# Patient Record
Sex: FEMALE | Race: WHITE | NOT HISPANIC OR LATINO | Employment: UNEMPLOYED
[De-identification: names, ages, dates, MRNs, and addresses within clinical notes are randomized per-mention and may not be internally consistent; named-entity substitution may affect disease eponyms.]

---

## 2017-01-30 ENCOUNTER — RECORDS - HEALTHEAST (OUTPATIENT)
Dept: ADMINISTRATIVE | Facility: OTHER | Age: 27
End: 2017-01-30

## 2021-09-24 ENCOUNTER — HOSPITAL ENCOUNTER (EMERGENCY)
Facility: CLINIC | Age: 31
Discharge: HOME | End: 2021-09-24
Admitting: PHYSICIAN ASSISTANT
Payer: COMMERCIAL

## 2021-09-24 VITALS
RESPIRATION RATE: 14 BRPM | SYSTOLIC BLOOD PRESSURE: 117 MMHG | WEIGHT: 108 LBS | DIASTOLIC BLOOD PRESSURE: 74 MMHG | HEART RATE: 92 BPM | TEMPERATURE: 98.9 F | OXYGEN SATURATION: 100 %
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
## 2021-09-24 DIAGNOSIS — S61.259A OPEN WOUND OF FINGER OF LEFT HAND DUE TO DOG BITE: ICD-10-CM

## 2021-09-24 DIAGNOSIS — S01.01XA SCALP LACERATION, INITIAL ENCOUNTER: ICD-10-CM

## 2021-09-24 DIAGNOSIS — S01.85XA OPEN WOUND OF FACE DUE TO DOG BITE: ICD-10-CM
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
## 2021-09-24 PROCEDURE — 12004 RPR S/N/AX/GEN/TRK7.6-12.5CM: CPT

## 2021-09-24 PROCEDURE — 250N000011 HC RX IP 250 OP 636: Performed by: PHYSICIAN ASSISTANT

## 2021-09-24 PROCEDURE — 90471 IMMUNIZATION ADMIN: CPT | Performed by: PHYSICIAN ASSISTANT

## 2021-09-24 PROCEDURE — 99283 EMERGENCY DEPT VISIT LOW MDM: CPT | Mod: 25

## 2021-09-24 PROCEDURE — 90715 TDAP VACCINE 7 YRS/> IM: CPT | Performed by: PHYSICIAN ASSISTANT

## 2021-09-24 PROCEDURE — 250N000013 HC RX MED GY IP 250 OP 250 PS 637: Performed by: PHYSICIAN ASSISTANT

## 2021-09-24 RX ORDER — ACETAMINOPHEN 325 MG/1
975 TABLET ORAL ONCE
Status: COMPLETED | OUTPATIENT
Start: 2021-09-24 | End: 2021-09-24

## 2021-09-24 RX ADMIN — CLOSTRIDIUM TETANI TOXOID ANTIGEN (FORMALDEHYDE INACTIVATED), CORYNEBACTERIUM DIPHTHERIAE TOXOID ANTIGEN (FORMALDEHYDE INACTIVATED), BORDETELLA PERTUSSIS TOXOID ANTIGEN (GLUTARALDEHYDE INACTIVATED), BORDETELLA PERTUSSIS FILAMENTOUS HEMAGGLUTININ ANTIGEN (FORMALDEHYDE INACTIVATED), BORDETELLA PERTUSSIS PERTACTIN ANTIGEN, AND BORDETELLA PERTUSSIS FIMBRIAE 2/3 ANTIGEN 0.5 ML: 5; 2; 2.5; 5; 3; 5 INJECTION, SUSPENSION INTRAMUSCULAR at 09:02

## 2021-09-24 RX ADMIN — ACETAMINOPHEN 975 MG: 325 TABLET ORAL at 09:53

## 2021-09-24 ASSESSMENT — ENCOUNTER SYMPTOMS
WOUND: 1
BRUISES/BLEEDS EASILY: 0
WEAKNESS: 0
NUMBNESS: 0

## 2021-09-24 NOTE — DISCHARGE INSTRUCTIONS
Dog Bite  A dog bite can cause a wound deep enough to break the skin. In such cases, the wound is cleaned and sometimes closed. Wounds would be closed if they are gaping open or in cosmetically important areas such as the face. If the wound is closed, it is usually not completely closed. This is so that fluid can drain if the wound becomes infected. Often, wounds will be left open to heal. In addition to wound care, a tetanus shot (injection) may be given, if needed.     Home care  Wash your hands well with soap and warm water before and after caring for the wound. This helps lower the risk of infection.  Care for the wound as directed. If a dressing was applied to the wound, be sure to change it as directed.  If the wound bleeds, place a clean, soft cloth on the wound. Then firmly apply pressure until the bleeding stops. This may take up to 5 minutes. Don't release the pressure and look at the wound during this time.  Most wounds heal within 10 days. But an infection can occur even with correct treatment. So be sure to check the wound daily for signs of infection (see below).  Antibiotics may be prescribed. These help prevent or treat infection. If you re given antibiotics, take them as directed. Also be sure to complete the medicines.  Rabies prevention  Rabies is a virus that can be carried in certain animals. These can include domestic animals such as dogs and cats. Pets fully vaccinated against rabies (2 shots) are at very low risk of infection. But because human rabies is almost always fatal, any biting pet should be confined for 10 days as an extra precaution. In general, if there is a risk for rabies, the following steps may need to be taken:   If someone s pet dog has bitten you, it should be kept in a secure area for the next 10 days to watch for signs of illness. (If the pet owner won t allow this, contact your local animal control center.) Ask to see the pet's vaccination history records. If the dog  becomes ill or dies during that time, contact your local animal control center at once so the animal may be tested for rabies. If the dog stays healthy for the next 10 days, there is no danger of rabies in the animal or you.  If a stray dog bit you, contact your local animal control center. They can give information on capture, quarantine, and animal rabies testing.  If you can t find the animal that bit you in the next 2 days, and if rabies exists in your area, you may need to receive the rabies vaccine series. Call your healthcare provider right away. Or return to the emergency department promptly.  Follow-up care  Follow up with your healthcare provider, or as directed.  When to get medical advice  Call your healthcare provider or get medical attention right away if any of these occur:   Signs of infection:  Spreading redness or warmth from the wound  Increased pain or swelling  Fever of 100.4 F (38 C) or higher, or as directed by your healthcare provider  Colored fluid or pus draining from the wound  Signs of rabies infection. Don't wait for any of these symptoms to occur! If you suspect that the dog that bit you is rabid, or if the dog is lost and can't be found, you should get the vaccine series.  Headache  Confusion  Strange behavior  Increased salivating and drooling  Seizure  Hallucination, anxiety, or agitation  Fever  Decreased ability to move any body part near the wound  Bleeding that can't be stopped after 5 minutes of firm pressure  Lauren last reviewed this educational content on 8/1/2019 2000-2020 [employer]. [address] [place] [address], [place] [address]. All rights reserved. This information is not intended as a substitute for professional medical care. Always follow your healthcare professional's instructions.  This information has been modified by your health care provider with permission from the publisher.      3 sutures and 7 staples have been placed to close your lacerations.  These should be removed in about 10 days.  Would recommend follow up at your clinic to have them removed.  Clean wounds daily with regular soap and water and apply thin layer of bacitracin or neosporin daily. Avoid getting wet for 24 hours and then avoid soaking the wound (no swimming in pools/lakes). Monitor for any signs of infection and return to the ER if you notice any redness, pain, swelling, drainage. You will take augmentin twice daily for 5 days to prevent infection.     Talk with your friend. If the dog is not vaccinated for rabies, you should really consider getting the rabies series.

## 2021-09-24 NOTE — ED PROVIDER NOTES
EMERGENCY DEPARTMENT ENCOUNTER      NAME: Shantell Irizarry  AGE: 30 year old female  YOB: 1990  MRN: [id number]  EVALUATION DATE & TIME: 9/24/2021  8:22 AM    PCP: No primary care provider on file.    ED PROVIDER: Shantell Graham PA-C      Chief Complaint   Patient presents with     Dog Bite     Laceration         FINAL IMPRESSION:  1. Dog bite, initial encounter    2. Scalp laceration, initial encounter    3. Open wound of face due to dog bite    4. Open wound of finger of left hand due to dog bite          MEDICAL DECISION MAKING:    Pertinent Labs & Imaging studies reviewed. (See chart for details)  30 year old female presents to the Emergency Department for evaluation of lacerations from dog bite. Patient's friend's dog attacked her this morning and she has lacerations to her scalp, face and left hand. She is unsure when her last tdap was. Unsure if dog is vaccinated. She denies loss of consciousness. Able to perform ROM of hand and fingers without difficulty.    Vitals reviewed and notable for elevated blood pressure which normalized on re-check. On exam she has two parallel lacerations to right parietal scalp, one small laceration to right temporal scalp, and puncture wounds to left 3rd and 5th digits. Distal CMTS intact. No visualized foreign bodies.     Patient declines xray of her hand. She has minimal tenderness and is able to perform full ROM of her fingers without pain. Low suspicion for fracture. Wound irrigated copiously. Larger scalp lacerations repaired with staples, smaller scalp laceration and puncture wounds to fingers closed loosely with sutures. She was placed on augmentin for infection prophylaxis. Tdap was updated. We discussed return precautions, wound care and follow up instructions for staple/suture removal. Patient discharged home in stable condition.     0 minutes of critical care time     ED COURSE:  8:32 AM I met with the patient, obtained history, performed an initial exam,  and discussed options and plan for diagnostics and treatment here in the ED.  9:22 AM Checked in on and updated patient.   9:45 AM Checked in on and updated patient.   9:56 AM Checked in on and updated patient. Performed laceration repairs and discussed the plan for discharge with the patient, and patient is agreeable.  We discussed supportive cares at home and reasons for return to the ER including new or worsening symptoms - all questions and concerns addressed.  Patient to be discharged by RN.    At the conclusion of the encounter I discussed the results of all of the tests and the disposition. The questions were answered. The patient acknowledged understanding and was agreeable with the care plan.     MEDICATIONS GIVEN IN THE EMERGENCY:  Medications   Tdap (tetanus-diphtheria-acell pertussis) (ADACEL) injection 0.5 mL (0.5 mLs Intramuscular Given 9/24/21 0902)   acetaminophen (TYLENOL) tablet 975 mg (975 mg Oral Given 9/24/21 0953)       NEW PRESCRIPTIONS STARTED AT TODAY'S ER VISIT  There are no discharge medications for this patient.           =================================================================    HPI:    Patient information was obtained from: Patient    Use of Interpretor: N/A        Shantell Irizarry is a 30 year old female with no pertinent history who presents to this ED private vehicle for evaluation of dog bite.    Patient was attacked approximately 1.5 hours prior to arrival by her friends Peter Alegre.  She sustained lacerations to her right scalp, 2 puncture wounds to her face, 2 puncture wounds on her left hand.  She denies any loss of consciousness.  She denies any difficulties with range of motion of her hand.  She is going to verify with her friend if the dog is vaccinated.  She is unsure when her last tetanus immunization was.      REVIEW OF SYSTEMS:  Review of Systems   Skin: Positive for wound (dog bite, lacerations to scalp, face and left hand).   Allergic/Immunologic: Negative  for immunocompromised state.   Neurological: Negative for weakness and numbness.   Hematological: Does not bruise/bleed easily.   All other systems reviewed and are negative.      PAST MEDICAL HISTORY:  No past medical history on file.    PAST SURGICAL HISTORY:  No past surgical history on file.        CURRENT MEDICATIONS:    No current facility-administered medications for this encounter.    Current Outpatient Medications:      amoxicillin-clavulanate (AUGMENTIN) 875-125 MG tablet, Take 1 tablet by mouth 2 times daily for 5 days, Disp: 10 tablet, Rfl: 0      ALLERGIES:  No Known Allergies    FAMILY HISTORY:  No family history on file.    SOCIAL HISTORY:   Social History     Socioeconomic History     Marital status: Single     Spouse name: Not on file     Number of children: Not on file     Years of education: Not on file     Highest education level: Not on file   Occupational History     Not on file   Tobacco Use     Smoking status: Not on file   Substance and Sexual Activity     Alcohol use: Not on file     Drug use: Not on file     Sexual activity: Not on file   Other Topics Concern     Not on file   Social History Narrative     Not on file     Social Determinants of Health     Financial Resource Strain:      Difficulty of Paying Living Expenses:    Food Insecurity:      Worried About Running Out of Food in the Last Year:      Ran Out of Food in the Last Year:    Transportation Needs:      Lack of Transportation (Medical):      Lack of Transportation (Non-Medical):    Physical Activity:      Days of Exercise per Week:      Minutes of Exercise per Session:    Stress:      Feeling of Stress :    Social Connections:      Frequency of Communication with Friends and Family:      Frequency of Social Gatherings with Friends and Family:      Attends [employer] Services:      Active Member of Clubs or Organizations:      Attends Club or Organization Meetings:      Marital Status:    Intimate Partner Violence:      Fear of  Current or Ex-Partner:      Emotionally Abused:      Physically Abused:      Sexually Abused:        VITALS:  Patient Vitals for the past 24 hrs:   BP Temp Temp src Pulse Resp SpO2 Weight   09/24/21 0910 117/74 -- -- -- -- -- --   09/24/21 0743 (!) 148/91 98.9  F (37.2  C) Oral 92 14 100 % 49 kg (108 lb)       PHYSICAL EXAM    Constitutional: Well developed, Well nourished, NAD  HENT: Normocephalic. Two puncture wounds along right zygomatic process. No visualized foreign bodies. 4 cm laceration on right parietal scalp parallel to a 5 cm laceration. 1 cm puncture wound to right temporal scalp. No foreign bodies. No active bleeding. 1 cm superficial abrasion to right auricle. No auricular hematoma. Oropharynx normal, mucous membranes moist, Nose normal.   Neck: Normal range of motion, No midline cervical spine tenderness, Supple, No stridor.  Eyes: Conjunctiva normal, No discharge.   Respiratory: Normal breath sounds, No respiratory distress, No wheezing, Speaks full sentences easily. No cough.  Cardiovascular: Normal heart rate, Regular rhythm No murmurs, No rubs, No gallops. Chest wall nontender.  GI: Soft, No tenderness, No masses, No flank tenderness. No rebound or guarding.  Musculoskeletal: 2+ radial pulses. No edema. No cyanosis, No clubbing. Good range of motion in all major joints.  Integument: Warm, Dry, No erythema, No rash. No petechiae. Puncture wound to left pinky finger and left middle finger. Full ROM intact at MCP, PIP and DIP. Brisk capillary refill. Distal sensation intact. No visualized foreign bodies.  Neurologic: Alert & oriented x 3, Normal motor function, Normal sensory function, No focal deficits noted. Normal gait.  Psychiatric: Affect normal, Judgment normal, Mood normal. Cooperative.    PROCEDURES:     PROCEDURE: Laceration Repair   INDICATIONS: Laceration   PROCEDURE PROVIDER:  Shantell Graham PA-C   SITE: Left 5th finger   TYPE/SIZE: simple, clean and no foreign body visualized  0.5 cm  (total length)   FUNCTIONAL ASSESSMENT: Distal sensation, circulation, motor and tendon function intact   MEDICATION: 1 mLs of 1% Lidocaine without epinephrine   PREPARATION: scrubbing and irrigation with Normal saline and Hibiclens   DEBRIDEMENT: no debridement and wound explored, no foreign body found   CLOSURE:  Wound was closed in   one layer: Skin closed with simple interrupted stitches of 5-0 Ethilon    Total number of sutures/staples placed: 1       PROCEDURE: Laceration Repair   INDICATIONS: Laceration   PROCEDURE PROVIDER:  Shantell Graham PA-C   SITE: Left middle finger   TYPE/SIZE: simple, clean and no foreign body visualized  0.5 cm (total length)   FUNCTIONAL ASSESSMENT: Distal sensation, circulation, motor and tendon function intact   MEDICATION: 1 mLs of 1% Lidocaine without epinephrine   PREPARATION: scrubbing and irrigation with Normal saline and Hibiclens   DEBRIDEMENT: no debridement and wound explored, no foreign body found   CLOSURE:  Wound was closed in   one layer: Skin closed with simple interrupted stitches of 5-0 Ethilon    Total number of sutures/staples placed: 1       PROCEDURE: Laceration Repair   INDICATIONS: Laceration   PROCEDURE PROVIDER:  Shantell Graham PA-C   SITE: Right scalp near temple   TYPE/SIZE: simple, clean and no foreign body visualized  1 cm (total length)   FUNCTIONAL ASSESSMENT: Distal sensation and circulation intact   MEDICATION: 1 mLs of 1% Lidocaine without epinephrine   PREPARATION: scrubbing and irrigation with Normal saline and Hibiclens   DEBRIDEMENT: no debridement and wound explored, no foreign body found   CLOSURE:  Wound was closed in   one layer: Skin closed with simple interrupted stitches of 5-0 Ethilon    Total number of sutures/staples placed: 1       PROCEDURE: Laceration Repair   INDICATIONS: Laceration   PROCEDURE PROVIDER:  Shantell Graham PA-C   SITE: Right parietal scalp   TYPE/SIZE: simple, clean and no foreign body visualized  4 cm (total length)    FUNCTIONAL ASSESSMENT: Distal sensation and circulation intact   MEDICATION: 2 mLs of 1% Lidocaine without epinephrine   PREPARATION: scrubbing and irrigation with Normal saline and Hibiclens   DEBRIDEMENT: no debridement and wound explored, no foreign body found   CLOSURE:  Wound was closed in   3 staples         PROCEDURE: Laceration Repair   INDICATIONS: Laceration   PROCEDURE PROVIDER:  Shantell Graham PA-C   SITE: Right parietal scalp   TYPE/SIZE: simple, clean and no foreign body visualized  5 cm (total length)   FUNCTIONAL ASSESSMENT: Distal sensation and circulation intact   MEDICATION: 2 mLs of 1% Lidocaine without epinephrine   PREPARATION: scrubbing and irrigation with Normal saline and Hibiclens   DEBRIDEMENT: no debridement and wound explored, no foreign body found   CLOSURE:  Wound was closed in   4 merari         VICKI, Kaley Burns, am serving as a scribe to document services personally performed by Shantell Graham PA-C based on my observation and the provider's statements to me. IShantell PA-C attest that Kaley Burns is acting in a scribe capacity, has observed my performance of the services and has documented them in accordance with my direction.    Shantell Graham PA-C  Emergency Medicine  [facility]  9/24/2021       Shantell Graham PA-C  09/25/21 1836

## 2021-09-24 NOTE — ED TRIAGE NOTES
Pt states she was attacked by a friends [place] laboy dog about 1.56 hours ago, Pt has large laceration to right side of scalp, puncture wounds to right side of face and left hand. Unsure if dog UTD on immunizations. Pt unsure of tetanus status, unable to visualize any other lacs due to a lot of dried blood on scalp area.

## 2022-05-29 ENCOUNTER — HOSPITAL ENCOUNTER (EMERGENCY)
Facility: CLINIC | Age: 32
Discharge: HOME | End: 2022-05-29
Payer: COMMERCIAL

## 2022-05-29 VITALS
RESPIRATION RATE: 18 BRPM | DIASTOLIC BLOOD PRESSURE: 75 MMHG | SYSTOLIC BLOOD PRESSURE: 118 MMHG | OXYGEN SATURATION: 95 % | HEART RATE: 119 BPM | HEIGHT: 63 IN | BODY MASS INDEX: 23.39 KG/M2 | TEMPERATURE: 99.7 F | WEIGHT: 132 LBS

## 2022-05-30 ENCOUNTER — APPOINTMENT (OUTPATIENT)
Dept: CT IMAGING | Facility: CLINIC | Age: 32
End: 2022-05-30
Attending: EMERGENCY MEDICINE
Payer: COMMERCIAL

## 2022-05-30 ENCOUNTER — HOSPITAL ENCOUNTER (EMERGENCY)
Facility: CLINIC | Age: 32
Discharge: HOME | End: 2022-05-30
Attending: EMERGENCY MEDICINE | Admitting: EMERGENCY MEDICINE
Payer: COMMERCIAL

## 2022-05-30 VITALS
HEIGHT: 62 IN | OXYGEN SATURATION: 99 % | RESPIRATION RATE: 18 BRPM | BODY MASS INDEX: 24.29 KG/M2 | HEART RATE: 96 BPM | TEMPERATURE: 98.7 F | DIASTOLIC BLOOD PRESSURE: 54 MMHG | SYSTOLIC BLOOD PRESSURE: 96 MMHG | WEIGHT: 132 LBS

## 2022-05-30 DIAGNOSIS — R07.9 CHEST PAIN, UNSPECIFIED TYPE: ICD-10-CM

## 2022-05-30 DIAGNOSIS — B00.1 HERPES LABIALIS: ICD-10-CM

## 2022-05-30 DIAGNOSIS — J18.9 COMMUNITY ACQUIRED PNEUMONIA OF RIGHT LOWER LOBE OF LUNG: ICD-10-CM

## 2022-05-30 LAB
ALBUMIN UR-MCNC: 70 MG/DL
ANION GAP SERPL CALCULATED.3IONS-SCNC: 12 MMOL/L (ref 5–18)
APPEARANCE UR: ABNORMAL
ATRIAL RATE - MUSE: 103 BPM
BACTERIA #/AREA URNS HPF: ABNORMAL /HPF
BASOPHILS # BLD AUTO: 0 10E3/UL (ref 0–0.2)
BASOPHILS NFR BLD AUTO: 0 %
BILIRUB UR QL STRIP: NEGATIVE
BNP SERPL-MCNC: 15 PG/ML (ref 0–64)
BUN SERPL-MCNC: 8 MG/DL (ref 8–22)
C REACTIVE PROTEIN LHE: 27.4 MG/DL (ref 0–0.8)
CALCIUM SERPL-MCNC: 8.7 MG/DL (ref 8.5–10.5)
CHLORIDE BLD-SCNC: 100 MMOL/L (ref 98–107)
CLUE CELLS: PRESENT
CO2 SERPL-SCNC: 23 MMOL/L (ref 22–31)
COLOR UR AUTO: YELLOW
CREAT SERPL-MCNC: 0.66 MG/DL (ref 0.6–1.1)
D DIMER PPP FEU-MCNC: 1.69 UG/ML FEU (ref 0–0.5)
DIASTOLIC BLOOD PRESSURE - MUSE: 58 MMHG
EOSINOPHIL # BLD AUTO: 0 10E3/UL (ref 0–0.7)
EOSINOPHIL NFR BLD AUTO: 0 %
ERYTHROCYTE [DISTWIDTH] IN BLOOD BY AUTOMATED COUNT: 12.9 % (ref 10–15)
GFR SERPL CREATININE-BSD FRML MDRD: >90 ML/MIN/1.73M2
GLUCOSE BLD-MCNC: 119 MG/DL (ref 70–125)
GLUCOSE UR STRIP-MCNC: NEGATIVE MG/DL
HCG UR QL: NEGATIVE
HCT VFR BLD AUTO: 34.5 % (ref 35–47)
HGB BLD-MCNC: 11.6 G/DL (ref 11.7–15.7)
HGB UR QL STRIP: ABNORMAL
HOLD SPECIMEN: NORMAL
HOLD SPECIMEN: NORMAL
IMM GRANULOCYTES # BLD: 0.1 10E3/UL
IMM GRANULOCYTES NFR BLD: 1 %
INTERPRETATION ECG - MUSE: NORMAL
KETONES UR STRIP-MCNC: NEGATIVE MG/DL
LEUKOCYTE ESTERASE UR QL STRIP: ABNORMAL
LYMPHOCYTES # BLD AUTO: 1.3 10E3/UL (ref 0.8–5.3)
LYMPHOCYTES NFR BLD AUTO: 8 %
MCH RBC QN AUTO: 27.8 PG (ref 26.5–33)
MCHC RBC AUTO-ENTMCNC: 33.6 G/DL (ref 31.5–36.5)
MCV RBC AUTO: 83 FL (ref 78–100)
MONOCYTES # BLD AUTO: 1 10E3/UL (ref 0–1.3)
MONOCYTES NFR BLD AUTO: 6 %
MUCOUS THREADS #/AREA URNS LPF: PRESENT /LPF
NEUTROPHILS # BLD AUTO: 13.6 10E3/UL (ref 1.6–8.3)
NEUTROPHILS NFR BLD AUTO: 85 %
NITRATE UR QL: POSITIVE
NRBC # BLD AUTO: 0 10E3/UL
NRBC BLD AUTO-RTO: 0 /100
P AXIS - MUSE: 62 DEGREES
PH UR STRIP: 6.5 [PH] (ref 5–7)
PLATELET # BLD AUTO: 161 10E3/UL (ref 150–450)
POTASSIUM BLD-SCNC: 3.5 MMOL/L (ref 3.5–5)
PR INTERVAL - MUSE: 128 MS
QRS DURATION - MUSE: 84 MS
QT - MUSE: 342 MS
QTC - MUSE: 448 MS
R AXIS - MUSE: 57 DEGREES
RBC # BLD AUTO: 4.17 10E6/UL (ref 3.8–5.2)
RBC URINE: 33 /HPF
SODIUM SERPL-SCNC: 135 MMOL/L (ref 136–145)
SP GR UR STRIP: 1.03 (ref 1–1.03)
SQUAMOUS EPITHELIAL: 2 /HPF
SYSTOLIC BLOOD PRESSURE - MUSE: 118 MMHG
T AXIS - MUSE: 44 DEGREES
TRICHOMONAS, WET PREP: ABNORMAL
TROPONIN I SERPL-MCNC: <0.01 NG/ML (ref 0–0.29)
UROBILINOGEN UR STRIP-MCNC: <2 MG/DL
VENTRICULAR RATE- MUSE: 103 BPM
WBC # BLD AUTO: 16 10E3/UL (ref 4–11)
WBC URINE: 173 /HPF
WBC'S/HIGH POWER FIELD, WET PREP: ABNORMAL
YEAST, WET PREP: ABNORMAL

## 2022-05-30 PROCEDURE — 87591 N.GONORRHOEAE DNA AMP PROB: CPT | Performed by: EMERGENCY MEDICINE

## 2022-05-30 PROCEDURE — 250N000013 HC RX MED GY IP 250 OP 250 PS 637: Performed by: EMERGENCY MEDICINE

## 2022-05-30 PROCEDURE — 96361 HYDRATE IV INFUSION ADD-ON: CPT

## 2022-05-30 PROCEDURE — 86140 C-REACTIVE PROTEIN: CPT | Performed by: EMERGENCY MEDICINE

## 2022-05-30 PROCEDURE — 84484 ASSAY OF TROPONIN QUANT: CPT | Performed by: EMERGENCY MEDICINE

## 2022-05-30 PROCEDURE — 87210 SMEAR WET MOUNT SALINE/INK: CPT | Performed by: EMERGENCY MEDICINE

## 2022-05-30 PROCEDURE — 258N000003 HC RX IP 258 OP 636: Performed by: EMERGENCY MEDICINE

## 2022-05-30 PROCEDURE — 81001 URINALYSIS AUTO W/SCOPE: CPT | Performed by: EMERGENCY MEDICINE

## 2022-05-30 PROCEDURE — 83880 ASSAY OF NATRIURETIC PEPTIDE: CPT | Performed by: EMERGENCY MEDICINE

## 2022-05-30 PROCEDURE — 96375 TX/PRO/DX INJ NEW DRUG ADDON: CPT

## 2022-05-30 PROCEDURE — 93005 ELECTROCARDIOGRAM TRACING: CPT | Performed by: EMERGENCY MEDICINE

## 2022-05-30 PROCEDURE — 96365 THER/PROPH/DIAG IV INF INIT: CPT

## 2022-05-30 PROCEDURE — 99285 EMERGENCY DEPT VISIT HI MDM: CPT | Mod: 25

## 2022-05-30 PROCEDURE — 250N000011 HC RX IP 250 OP 636: Performed by: EMERGENCY MEDICINE

## 2022-05-30 PROCEDURE — 36415 COLL VENOUS BLD VENIPUNCTURE: CPT | Performed by: EMERGENCY MEDICINE

## 2022-05-30 PROCEDURE — 85379 FIBRIN DEGRADATION QUANT: CPT | Performed by: EMERGENCY MEDICINE

## 2022-05-30 PROCEDURE — 71275 CT ANGIOGRAPHY CHEST: CPT

## 2022-05-30 PROCEDURE — 87086 URINE CULTURE/COLONY COUNT: CPT | Performed by: EMERGENCY MEDICINE

## 2022-05-30 PROCEDURE — 80048 BASIC METABOLIC PNL TOTAL CA: CPT | Performed by: EMERGENCY MEDICINE

## 2022-05-30 PROCEDURE — 87491 CHLMYD TRACH DNA AMP PROBE: CPT | Performed by: EMERGENCY MEDICINE

## 2022-05-30 PROCEDURE — 85014 HEMATOCRIT: CPT | Performed by: EMERGENCY MEDICINE

## 2022-05-30 PROCEDURE — 81025 URINE PREGNANCY TEST: CPT | Performed by: EMERGENCY MEDICINE

## 2022-05-30 RX ORDER — VALACYCLOVIR HYDROCHLORIDE 1 G/1
1000 TABLET, FILM COATED ORAL 2 TIMES DAILY
Qty: 20 TABLET | Refills: 0 | Status: SHIPPED | OUTPATIENT
Start: 2022-05-30 | End: 2024-07-09

## 2022-05-30 RX ORDER — AZITHROMYCIN 500 MG/1
500 TABLET, FILM COATED ORAL ONCE
Status: COMPLETED | OUTPATIENT
Start: 2022-05-30 | End: 2022-05-30

## 2022-05-30 RX ORDER — OXYCODONE AND ACETAMINOPHEN 5; 325 MG/1; MG/1
1 TABLET ORAL ONCE
Status: COMPLETED | OUTPATIENT
Start: 2022-05-30 | End: 2022-05-30

## 2022-05-30 RX ORDER — CEFDINIR 300 MG/1
300 CAPSULE ORAL 2 TIMES DAILY
Qty: 14 CAPSULE | Refills: 0 | Status: SHIPPED | OUTPATIENT
Start: 2022-05-30 | End: 2024-07-09

## 2022-05-30 RX ORDER — AZITHROMYCIN 500 MG/5ML
500 INJECTION, POWDER, LYOPHILIZED, FOR SOLUTION INTRAVENOUS ONCE
Status: DISCONTINUED | OUTPATIENT
Start: 2022-05-30 | End: 2022-05-30

## 2022-05-30 RX ORDER — AZITHROMYCIN 250 MG/1
250 TABLET, FILM COATED ORAL DAILY
Qty: 4 TABLET | Refills: 0 | Status: SHIPPED | OUTPATIENT
Start: 2022-05-31 | End: 2022-06-04

## 2022-05-30 RX ORDER — IOPAMIDOL 755 MG/ML
100 INJECTION, SOLUTION INTRAVASCULAR ONCE
Status: COMPLETED | OUTPATIENT
Start: 2022-05-30 | End: 2022-05-30

## 2022-05-30 RX ORDER — OXYCODONE HYDROCHLORIDE 5 MG/1
5 TABLET ORAL EVERY 6 HOURS PRN
Qty: 6 TABLET | Refills: 0 | Status: SHIPPED | OUTPATIENT
Start: 2022-05-30 | End: 2022-06-02

## 2022-05-30 RX ORDER — CEFTRIAXONE 1 G/1
1 INJECTION, POWDER, FOR SOLUTION INTRAMUSCULAR; INTRAVENOUS ONCE
Status: COMPLETED | OUTPATIENT
Start: 2022-05-30 | End: 2022-05-30

## 2022-05-30 RX ORDER — KETOROLAC TROMETHAMINE 15 MG/ML
15 INJECTION, SOLUTION INTRAMUSCULAR; INTRAVENOUS ONCE
Status: COMPLETED | OUTPATIENT
Start: 2022-05-30 | End: 2022-05-30

## 2022-05-30 RX ADMIN — CEFTRIAXONE 1 G: 1 INJECTION, POWDER, FOR SOLUTION INTRAMUSCULAR; INTRAVENOUS at 09:21

## 2022-05-30 RX ADMIN — KETOROLAC TROMETHAMINE 15 MG: 15 INJECTION, SOLUTION INTRAMUSCULAR; INTRAVENOUS at 08:09

## 2022-05-30 RX ADMIN — AZITHROMYCIN DIHYDRATE 500 MG: 500 TABLET, FILM COATED ORAL at 09:22

## 2022-05-30 RX ADMIN — IOPAMIDOL 100 ML: 755 INJECTION, SOLUTION INTRAVENOUS at 08:21

## 2022-05-30 RX ADMIN — OXYCODONE AND ACETAMINOPHEN 1 TABLET: 5; 325 TABLET ORAL at 09:22

## 2022-05-30 RX ADMIN — SODIUM CHLORIDE 500 ML: 9 INJECTION, SOLUTION INTRAVENOUS at 08:09

## 2022-05-30 NOTE — DISCHARGE INSTRUCTIONS
You were seen today in the emergency department for right-sided chest pain.  Your CT scan did show recurrent right lower lobe pneumonia.  You were treated with the first dose of antibiotics for this.  The rest of your labs looked generally stable.  Given your immunocompromise status and significant chest pain, we did discuss admitting you for this.  Since you would prefer to be treated at home, you need to finish the full 7-day course of antibiotics as prescribed and follow-up closely in the clinic.  We would like you to continue taking Tylenol 500 mg and ibuprofen 600 mg every 6 hours for pain.  We would like you to review things closely in clinic, you may require additional x-rays or referrals based on any ongoing pulmonary symptoms.  We are concerned about the fact that this pneumonia has now happened 3 times in the last couple of years in the same location.    As a secondary concern, your exam today also showed evidence of an ongoing genital herpes flare.  We are going to prescribe you an antiviral antibiotic to take in addition to control this.  We sent off some other STD swabs and we will call you if the results are positive.  Please refrain from sexual intercourse for the next week while your testing is in process.    We should see you back in the emergency department if you have any other immediate concerns like high fever and weakness, intractable chest pain, shortness of breath, etc.  Please continue to follow-up closely in clinic within a week for recheck on symptoms

## 2022-05-30 NOTE — ED TRIAGE NOTES
"Bilateral knee pain started 2 months ago.     Chest pain started 3-4 days ago to right side of chest. Increases with cough.     Cough and shortness of breath started 2 days ago. Able to talk in full sentences and walk back to room from triage.     Pt states that she was called and has UTI. Pt also states she has \"sores down there as well\".      Triage Assessment     Row Name 05/30/22 0653       Triage Assessment (Adult)    Airway WDL WDL       Respiratory WDL    Respiratory WDL WDL       Skin Circulation/Temperature WDL    Skin Circulation/Temperature WDL WDL       Cardiac WDL    Cardiac WDL chest pain       Chest Pain Assessment    Chest Pain Location anterior chest, right    Chest Pain Radiation shoulder;back    Character pressure       Peripheral/Neurovascular WDL    Peripheral Neurovascular WDL WDL       Cognitive/Neuro/Behavioral WDL    Cognitive/Neuro/Behavioral WDL WDL              "

## 2022-05-30 NOTE — ED PROVIDER NOTES
EMERGENCY DEPARTMENT ENCOUNTER      NAME: Shantell Irizarry  AGE: 31 year old female  YOB: 1990  MRN: [id number]  EVALUATION DATE & TIME: 2022  6:49 AM    PCP: [facility]    ED PROVIDER: Santos Harris M.D.      Chief Complaint   Patient presents with     Multiple Complaints       FINAL IMPRESSION:  1. Community acquired pneumonia of right lower lobe of lung    2. Chest pain, unspecified type    3. Herpes labialis        ED COURSE & MEDICAL DECISION MAKIN year old female presents to the Emergency Department for evaluation of right-sided chest pain, cough, lower extremity edema, and secondary concerns about possible UTI and labial pain.  She is tachycardic but otherwise vitally stable when she arrives to the emergency department.  History of SLE and a couple episodes of community-acquired pneumonia in her right lower lobe.  Her D-dimer is elevated today prompting CT pulmonary angiogram negative for PE but does show recurrent inflammatory/infectious process in the right lower lobe with a dense consolidation there.  I think this does correlate to a right-sided chest pain.  She also has a leukocytosis to 16 suggestive of some early systemic infection.  She does not have any urinary symptoms, however her urine also looks fairly concerning for infection with nitrite positive and a good degree of pyuria.  She was given a dose of ceftriaxone and oral azithromycin here to start treatment for her community-acquired pneumonia, should also be adequate urinary coverage.    Patient has secondary concern about labial pain.  On exam she does have a couple of vesicles on her right inner labia which appear consistent with herpes simplex.  She reports a history of this in the past, so I do not think any further testing would be required.  I am going to add on valacyclovir to treat this flare as well.  Given that she is having some vaginal discharge, I did send gonorrhea and  chlamydia swabs.  She should be empirically covered for gonorrhea with the IV ceftriaxone she received here and can follow-up on chlamydia screening, she is also going to be receiving azithromycin and a short course.    I discussed things with the patient.  At the end of the day I had initially recommended her for admission given her immunocompromise state, significant consolidation on x-ray, and pain.  Patient was strongly opposed to this, preferring any alternative to stay at home and treat with p.o. antibiotics.  Given that she is maintaining oxygen saturations in the upper 90s, is breathing comfortably on room air, and otherwise has generally stable labs, I do not think this is completely unreasonable.  We did discuss strict return precautions for any worsening shortness of breath, intractable chest pain, high fever and weakness, etc.  She will need very close follow-up with her primary/rheumatology provider to review any ongoing symptoms.  I think given the recurrence of this right lower lobe pneumonia, and outpatient pulmonary consultation would probably be in her best interest as well and I did discuss this with her.  Answered all of her questions, reviewed return precautions again, patient was discharged in stable condition.    6:56 AM I met with the patient, obtained history, performed an initial exam, and discussed options and plan for diagnostics and treatment here in the ED. PPE worn: N95 mask, gloves   9:41 AM We discussed the plan for discharge and the patient is agreeable. Reviewed supportive cares, symptomatic treatment, outpatient follow up, and reasons to return to the Emergency Department. Patient to be discharged by ED RN.       MEDICATIONS GIVEN IN THE EMERGENCY:  Medications   cefTRIAXone (ROCEPHIN) 1 g vial to attach to  mL bag for ADULTS or NS 50 mL bag for PEDS (1 g Intravenous New Bag 5/30/22 9065)   ketorolac (TORADOL) injection 15 mg (15 mg Intravenous Given 5/30/22 8215)   0.9%  "sodium chloride BOLUS (0 mLs Intravenous Stopped 5/30/22 0920)   iopamidol (ISOVUE-370) solution 100 mL (100 mLs Intravenous Given 5/30/22 0821)   azithromycin (ZITHROMAX) tablet 500 mg (500 mg Oral Given 5/30/22 0922)   oxyCODONE-acetaminophen (PERCOCET) 5-325 MG per tablet 1 tablet (1 tablet Oral Given 5/30/22 0922)       NEW PRESCRIPTIONS STARTED AT TODAY'S ER VISIT  New Prescriptions    AZITHROMYCIN (ZITHROMAX) 250 MG TABLET    Take 1 tablet (250 mg) by mouth daily for 4 days    CEFDINIR (OMNICEF) 300 MG CAPSULE    Take 1 capsule (300 mg) by mouth 2 times daily    OXYCODONE (ROXICODONE) 5 MG TABLET    Take 1 tablet (5 mg) by mouth every 6 hours as needed for breakthrough pain    VALACYCLOVIR (VALTREX) 1000 MG TABLET    Take 1 tablet (1,000 mg) by mouth 2 times daily for 10 days          =================================================================    HPI    Patient information was obtained from: Patient     Use of : N/A         Shantell Irizarry is a 31 year old female with a pertinent history of diabetes, anemia, TTP, lupus, and seizure who presents to this ED by walk in for evaluation of chest pain, leg swelling, and cough,     For the past week, the patient reports of bilateral leg swelling and redness, greater to the right than left. She adds that her knees feel like they're going to \"break\" whenever she gets up or sits down. The patient also reports that she has been having a cough for the past 2-3 days accompanied by right sided chest pain. She notes that taking deep breaths and laying on her right side worsens the pain. Today, the patient was unable to bear the chest pain, prompting her to be present in the ED. She has been taking 800 mg of Ibuprofen with no relief. She does not have a PCP but has a rheumatologist with [facility]. The patient otherwise denies abdominal pain and any other symptoms or complaints at this time.     Of note, the patient reports being diagnosed with a UTI at " "her Lupus work up last week. She was prescribed antibiotics and was told to see a provider for her \"sores down there.\" She has not taken the antibiotics.       REVIEW OF SYSTEMS   All systems reviewed and negative except as noted in HPI.    PAST MEDICAL HISTORY:  History reviewed. No pertinent past medical history.    PAST SURGICAL HISTORY:  History reviewed. No pertinent surgical history.        CURRENT MEDICATIONS:    Current Facility-Administered Medications   Medication     cefTRIAXone (ROCEPHIN) 1 g vial to attach to  mL bag for ADULTS or NS 50 mL bag for PEDS     Current Outpatient Medications   Medication     [START ON 5/31/2022] azithromycin (ZITHROMAX) 250 MG tablet     cefdinir (OMNICEF) 300 MG capsule     oxyCODONE (ROXICODONE) 5 MG tablet     valACYclovir (VALTREX) 1000 mg tablet         ALLERGIES:  No Known Allergies    FAMILY HISTORY:  History reviewed. No pertinent family history.    SOCIAL HISTORY:   Social History     Socioeconomic History     Marital status: Single       VITALS:  /55   Pulse 104   Temp 98.1  F (36.7  C) (Oral)   Resp 21   Ht 1.575 m (5' 2\")   Wt 59.9 kg (132 lb)   SpO2 99%   BMI 24.14 kg/m      PHYSICAL EXAM    Constitutional: Thin middle-aged female patient, laying in bed, no distress  HENT: Normocephalic, Atraumatic. Neck Supple.  Eyes: EOMI, Conjunctiva normal.  Respiratory: Crackles of the right lung base.  Lungs otherwise clear.  Breathing comfortably and speaking full sentences on room air.  Cardiovascular: Tachycardic heart rate, Regular rhythm. No peripheral edema.  Abdomen: Soft, nontender  : There are scattered vesicles involving the right inner labia.  There is moderate white vaginal discharge.  No cervical tenderness.  No other vaginal lesions.  Musculoskeletal: Good range of motion in all major joints. No major deformities noted.  Integument: Warm, Dry.  Neurologic: Alert & awake, Normal motor function, Normal sensory function, No focal deficits " noted.   Psychiatric: Cooperative. Affect appropriate.     LAB:  All pertinent labs reviewed and interpreted.  Labs Ordered and Resulted from Time of ED Arrival to Time of ED Departure   BASIC METABOLIC PANEL - Abnormal       Result Value    Sodium 135 (*)     Potassium 3.5      Chloride 100      Carbon Dioxide (CO2) 23      Anion Gap 12      Urea Nitrogen 8      Creatinine 0.66      Calcium 8.7      Glucose 119      GFR Estimate >90     D DIMER QUANTITATIVE - Abnormal    D-Dimer Quantitative 1.69 (*)    ROUTINE UA WITH MICROSCOPIC REFLEX TO CULTURE - Abnormal    Color Urine Yellow      Appearance Urine Turbid (*)     Glucose Urine Negative      Bilirubin Urine Negative      Ketones Urine Negative      Specific Gravity Urine 1.031 (*)     Blood Urine 0.03 mg/dL (*)     pH Urine 6.5      Protein Albumin Urine 70  (*)     Urobilinogen Urine <2.0      Nitrite Urine Positive (*)     Leukocyte Esterase Urine 500 Hyacinth/uL (*)     Bacteria Urine Many (*)     Mucus Urine Present (*)     RBC Urine 33 (*)     WBC Urine 173 (*)     Squamous Epithelials Urine 2 (*)    CRP INFLAMMATION - Abnormal    CRP 27.4 (*)    CBC WITH PLATELETS AND DIFFERENTIAL - Abnormal    WBC Count 16.0 (*)     RBC Count 4.17      Hemoglobin 11.6 (*)     Hematocrit 34.5 (*)     MCV 83      MCH 27.8      MCHC 33.6      RDW 12.9      Platelet Count 161      % Neutrophils 85      % Lymphocytes 8      % Monocytes 6      % Eosinophils 0      % Basophils 0      % Immature Granulocytes 1      NRBCs per 100 WBC 0      Absolute Neutrophils 13.6 (*)     Absolute Lymphocytes 1.3      Absolute Monocytes 1.0      Absolute Eosinophils 0.0      Absolute Basophils 0.0      Absolute Immature Granulocytes 0.1      Absolute NRBCs 0.0     WET PREPARATION - Abnormal    Trichomonas Absent      Yeast Absent      Clue Cells Present (*)     WBCs/high power field 4+ (*)    B-TYPE NATRIURETIC PEPTIDE ([facility] ONLY) - Normal    BNP 15     HCG QUALITATIVE URINE - Normal    hCG Urine  Qualitative Negative     TROPONIN I - Normal    Troponin I <0.01     URINE CULTURE   CHLAMYDIA TRACHOMATIS PCR   NEISSERIA GONORRHOEAE PCR       RADIOLOGY:  Reviewed all pertinent imaging. Please see official radiology report.  CT Chest Pulmonary Embolism w Contrast   Final Result   IMPRESSION:      1.  No acute pulmonary embolism or aortopathy.   2.  Right middle lobe consolidation which extends across the incomplete minor fissure to a limited extent in the anterior inferior right upper lobe, typical for a community-acquired bacterial pneumonia. The patient had similar consolidation in the right    middle lobe in November 2021 and in the lingula in October 2019, raising question of an underlying immune deficiency.   3.  Bilateral nonobstructing renal calculi.          EKG:    Performed at: 30-MAY-2022 07:27:58    Impression: Otherwise normal ECG    Rate: 103 bpm  Rhythm: Sinus tachycardia   Axis: 57  DE Interval: 128 ms  QRS Interval: 84 ms  QTc Interval: 448 ms  ST Changes: None  Comparison: No previous ECG available.     I have independently reviewed and interpreted the EKG(s) documented above.        I, Cheko Manzanares, am serving as a scribe to document services personally performed by Dr. Santos Harris, based on my observation and the provider's statements to me. I, Santos Harris MD attest that Cheko Manzanares is acting in a scribe capacity, has observed my performance of the services and has documented them in accordance with my direction.    Santos Harris M.D.  Emergency Medicine  [facility] EMERGENCY ROOM  [address] [address]  [phone]  Dept: [phone]     Santos Harris MD  05/30/22 0939

## 2022-05-30 NOTE — ED NOTES
Pt presents with C/O bilateral knee pain, lower leg swelling and pain.  Pt has a history of Lupus.  Also states a possible UTI, URI with cough, chest pains and SOB.

## 2022-05-30 NOTE — ED NOTES
MD updated on chest pain / shortness of breath. Wait on EKG as of now until MD sees. ...Nat Holland RN

## 2022-05-31 LAB
C TRACH DNA SPEC QL NAA+PROBE: NEGATIVE
N GONORRHOEA DNA SPEC QL NAA+PROBE: NEGATIVE

## 2022-06-01 LAB — BACTERIA UR CULT: ABNORMAL

## 2024-03-04 ENCOUNTER — HOSPITAL ENCOUNTER (EMERGENCY)
Facility: CLINIC | Age: 34
Discharge: HOME | End: 2024-03-04
Attending: EMERGENCY MEDICINE | Admitting: EMERGENCY MEDICINE
Payer: COMMERCIAL

## 2024-03-04 VITALS
HEIGHT: 62 IN | DIASTOLIC BLOOD PRESSURE: 70 MMHG | BODY MASS INDEX: 22.08 KG/M2 | TEMPERATURE: 97.7 F | OXYGEN SATURATION: 100 % | SYSTOLIC BLOOD PRESSURE: 110 MMHG | RESPIRATION RATE: 20 BRPM | HEART RATE: 88 BPM | WEIGHT: 120 LBS

## 2024-03-04 DIAGNOSIS — F11.20 FENTANYL USE DISORDER, SEVERE (H): ICD-10-CM

## 2024-03-04 DIAGNOSIS — F15.10 METHAMPHETAMINE ABUSE (H): ICD-10-CM

## 2024-03-04 LAB
ACANTHOCYTES BLD QL SMEAR: ABNORMAL
ALBUMIN SERPL BCG-MCNC: 3.8 G/DL (ref 3.5–5.2)
ALP SERPL-CCNC: 110 U/L (ref 40–150)
ALT SERPL W P-5'-P-CCNC: 10 U/L (ref 0–50)
ANION GAP SERPL CALCULATED.3IONS-SCNC: 7 MMOL/L (ref 7–15)
APAP SERPL-MCNC: <5 UG/ML (ref 10–30)
AST SERPL W P-5'-P-CCNC: 20 U/L (ref 0–45)
AUER BODIES BLD QL SMEAR: ABNORMAL
BASO STIPL BLD QL SMEAR: ABNORMAL
BASOPHILS # BLD AUTO: 0 10E3/UL (ref 0–0.2)
BASOPHILS NFR BLD AUTO: 1 %
BILIRUB SERPL-MCNC: 0.5 MG/DL
BITE CELLS BLD QL SMEAR: ABNORMAL
BLISTER CELLS BLD QL SMEAR: ABNORMAL
BUN SERPL-MCNC: 13.4 MG/DL (ref 6–20)
BURR CELLS BLD QL SMEAR: ABNORMAL
CALCIUM SERPL-MCNC: 9.1 MG/DL (ref 8.6–10)
CHLORIDE SERPL-SCNC: 103 MMOL/L (ref 98–107)
CREAT SERPL-MCNC: 0.65 MG/DL (ref 0.51–0.95)
DACRYOCYTES BLD QL SMEAR: ABNORMAL
DEPRECATED HCO3 PLAS-SCNC: 29 MMOL/L (ref 22–29)
EGFRCR SERPLBLD CKD-EPI 2021: >90 ML/MIN/1.73M2
ELLIPTOCYTES BLD QL SMEAR: ABNORMAL
EOSINOPHIL # BLD AUTO: 0.2 10E3/UL (ref 0–0.7)
EOSINOPHIL NFR BLD AUTO: 4 %
ERYTHROCYTE [DISTWIDTH] IN BLOOD BY AUTOMATED COUNT: 14.8 % (ref 10–15)
ETHANOL SERPL-MCNC: <0.01 G/DL
FRAGMENTS BLD QL SMEAR: ABNORMAL
GIANT PLATELETS BLD QL SMEAR: ABNORMAL
GLUCOSE SERPL-MCNC: 105 MG/DL (ref 70–99)
HCT VFR BLD AUTO: 37.1 % (ref 35–47)
HGB BLD-MCNC: 12 G/DL (ref 11.7–15.7)
HGB C CRYSTALS: ABNORMAL
HOWELL-JOLLY BOD BLD QL SMEAR: ABNORMAL
IMM GRANULOCYTES # BLD: 0 10E3/UL
IMM GRANULOCYTES NFR BLD: 0 %
LYMPHOCYTES # BLD AUTO: 1.6 10E3/UL (ref 0–5.3)
LYMPHOCYTES NFR BLD AUTO: 28 %
MCH RBC QN AUTO: 26 PG (ref 26.5–33)
MCHC RBC AUTO-ENTMCNC: 32.3 G/DL (ref 31.5–36.5)
MCV RBC AUTO: 80 FL (ref 78–100)
MONOCYTES # BLD AUTO: 0.5 10E3/UL (ref 0–1.3)
MONOCYTES NFR BLD AUTO: 9 %
NEUTROPHILS # BLD AUTO: 3.4 10E3/UL (ref 1.6–8.3)
NEUTROPHILS NFR BLD AUTO: 59 %
NEUTS HYPERSEG BLD QL SMEAR: ABNORMAL
NRBC # BLD AUTO: 0 10E3/UL
NRBC BLD AUTO-RTO: 0 /100
PATH REV: ABNORMAL
PLAT MORPH BLD: ABNORMAL
PLATELET # BLD AUTO: ABNORMAL 10*3/UL
POLYCHROMASIA BLD QL SMEAR: ABNORMAL
POTASSIUM SERPL-SCNC: 4.7 MMOL/L (ref 3.4–5.3)
PROT SERPL-MCNC: 6.8 G/DL (ref 6.4–8.3)
RBC # BLD AUTO: 4.62 10E6/UL (ref 3.8–5.2)
RBC AGGLUT BLD QL: ABNORMAL
RBC MORPH BLD: ABNORMAL
ROULEAUX BLD QL SMEAR: ABNORMAL
SALICYLATES SERPL-MCNC: <0.3 MG/DL
SICKLE CELLS BLD QL SMEAR: ABNORMAL
SMUDGE CELLS BLD QL SMEAR: ABNORMAL
SODIUM SERPL-SCNC: 139 MMOL/L (ref 135–145)
SPHEROCYTES BLD QL SMEAR: ABNORMAL
STOMATOCYTES BLD QL SMEAR: ABNORMAL
TARGETS BLD QL SMEAR: ABNORMAL
TOXIC GRANULES BLD QL SMEAR: ABNORMAL
VARIANT LYMPHS BLD QL SMEAR: ABNORMAL
WBC # BLD AUTO: 5.8 10E3/UL (ref 4–11)

## 2024-03-04 PROCEDURE — 99284 EMERGENCY DEPT VISIT MOD MDM: CPT

## 2024-03-04 PROCEDURE — 250N000013 HC RX MED GY IP 250 OP 250 PS 637: Performed by: EMERGENCY MEDICINE

## 2024-03-04 PROCEDURE — 85041 AUTOMATED RBC COUNT: CPT | Performed by: EMERGENCY MEDICINE

## 2024-03-04 PROCEDURE — 82077 ASSAY SPEC XCP UR&BREATH IA: CPT | Performed by: EMERGENCY MEDICINE

## 2024-03-04 PROCEDURE — 36415 COLL VENOUS BLD VENIPUNCTURE: CPT | Performed by: EMERGENCY MEDICINE

## 2024-03-04 PROCEDURE — 80053 COMPREHEN METABOLIC PANEL: CPT | Performed by: EMERGENCY MEDICINE

## 2024-03-04 PROCEDURE — 80143 DRUG ASSAY ACETAMINOPHEN: CPT | Performed by: EMERGENCY MEDICINE

## 2024-03-04 PROCEDURE — 93005 ELECTROCARDIOGRAM TRACING: CPT | Performed by: EMERGENCY MEDICINE

## 2024-03-04 PROCEDURE — 80179 DRUG ASSAY SALICYLATE: CPT | Performed by: EMERGENCY MEDICINE

## 2024-03-04 RX ORDER — CEPHALEXIN 500 MG/1
500 CAPSULE ORAL ONCE
Status: COMPLETED | OUTPATIENT
Start: 2024-03-04 | End: 2024-03-04

## 2024-03-04 RX ORDER — SULFAMETHOXAZOLE/TRIMETHOPRIM 800-160 MG
1 TABLET ORAL 2 TIMES DAILY
Qty: 20 TABLET | Refills: 0 | Status: SHIPPED | OUTPATIENT
Start: 2024-03-04 | End: 2024-03-14

## 2024-03-04 RX ORDER — SULFAMETHOXAZOLE/TRIMETHOPRIM 800-160 MG
1 TABLET ORAL ONCE
Status: COMPLETED | OUTPATIENT
Start: 2024-03-04 | End: 2024-03-04

## 2024-03-04 RX ORDER — CEPHALEXIN 500 MG/1
500 CAPSULE ORAL 4 TIMES DAILY
Qty: 40 CAPSULE | Refills: 0 | Status: SHIPPED | OUTPATIENT
Start: 2024-03-04 | End: 2024-03-14

## 2024-03-04 RX ADMIN — SULFAMETHOXAZOLE AND TRIMETHOPRIM 1 TABLET: 800; 160 TABLET ORAL at 03:22

## 2024-03-04 RX ADMIN — CEPHALEXIN 500 MG: 500 CAPSULE ORAL at 03:22

## 2024-03-04 ASSESSMENT — ACTIVITIES OF DAILY LIVING (ADL)
ADLS_ACUITY_SCORE: 35

## 2024-03-04 ASSESSMENT — COLUMBIA-SUICIDE SEVERITY RATING SCALE - C-SSRS
6. HAVE YOU EVER DONE ANYTHING, STARTED TO DO ANYTHING, OR PREPARED TO DO ANYTHING TO END YOUR LIFE?: NO
1. IN THE PAST MONTH, HAVE YOU WISHED YOU WERE DEAD OR WISHED YOU COULD GO TO SLEEP AND NOT WAKE UP?: NO
2. HAVE YOU ACTUALLY HAD ANY THOUGHTS OF KILLING YOURSELF IN THE PAST MONTH?: NO

## 2024-03-04 ASSESSMENT — ENCOUNTER SYMPTOMS: WOUND: 1

## 2024-03-04 NOTE — ED NOTES
Bed: [facility]18  Expected date: 3/4/24  Expected time: 2:07 AM  Means of arrival: Ambulance  Comments:  [place]

## 2024-03-04 NOTE — ED TRIAGE NOTES
Pt arrives in custody of [facility]. Per report from police, after patient was arrested and placed in the back of the  car, patient claims she ingested 3 grams of meth.  did not witness patient ingest anything.     Upon arrival, patient is alert + oriented x4. Calm and cooperative with staff. Patient states she ingested a baggy of meth, about 3 grams. Endorses smoking fentanyl earlier this evening as well. To note, patient has an infection left upper forearm d/t injecting meth. Patient states she was seen at [facility] for infection and discharged with antibiotics. Patient states she has only taken one dose. Patient also states she has an infection to bilateral lower extremities for past 3 years. Bilateral swelling to calf and feet.      Triage Assessment (Adult)       Row Name 03/04/24 0220          Triage Assessment    Airway WDL WDL        Respiratory WDL    Respiratory WDL WDL        Skin Circulation/Temperature WDL    Skin Circulation/Temperature WDL WDL        Cardiac WDL    Cardiac WDL WDL        Peripheral/Neurovascular WDL    Peripheral Neurovascular WDL WDL        Cognitive/Neuro/Behavioral WDL    Cognitive/Neuro/Behavioral WDL WDL

## 2024-03-04 NOTE — ED PROVIDER NOTES
EMERGENCY DEPARTMENT ENCOUNTER      NAME: Shantell Irizarry  AGE: 33 year old female  YOB: 1990  MRN: [id number]  EVALUATION DATE & TIME: 3/4/2024  2:17 AM    PCP: Dot Pickett [facility]    ED PROVIDER: Vonda Balderas M.D.      Chief Complaint   Patient presents with    Ingestion         FINAL IMPRESSION:  1. Methamphetamine abuse (H)    2. Fentanyl use disorder, severe (H)        MEDICAL DECISION MAKING:    Pertinent Labs & Imaging studies reviewed. (See chart for details)  ED Course as of 03/04/24 0553   Mon Mar 04, 2024   0232 Afebrile.  Vital signs here unremarkable.  Patient is coming in for evaluation after intentional ingestion of methamphetamines.  She arrives in custody of [facility] department.  Apparently after she was arrested and placed in the back of the Car patient states that she swallowed 3 g of methamphetamine that was wrapped up in a cigarette cellophane.  Says this happened approximately 45 minutes prior to arrival.  Not witnessed by .  She was seen for the exact same thing at [facility] on 2/29/2024.  Where she was being arrested and told them that she also swallowed 3 g of methamphetamines that was in a cigarette cellophane wrapper.  She states she also smokes fentanyl.  She reported she last smoked fentanyl earlier today.  Also noted she has an infection to the left upper forearm due to injecting meth several weeks ago.  During her visit at [facility], she was started on antibiotics.  Only took 1 day of antibiotics and did not take any further.   0244 Physical exam for patient here remarkable for open wound to her left forearm with some surrounding erythema, excoriation.  She has an open area that appears to have appropriate granulation tissue beneath.  Area around it is slightly warm to the touch.  No obvious abscess formation.  Bilateral lower extremities with 3+ nonpitting edema.  Dry, scaly skin, red skin from mid calf down to her  "feet bilaterally.  No evidence for any cellulitis.  Normal skin temperature to the touch.  No weeping.    I did speak with the  department, they were able to review the footage on their police car and states that they did not see her swallowing anything.  I did go back in and speak with patient regarding this.  She stated \"I swear to my kids of life I did swallow it\".  Given this we will plan for observation in the emergency department 8 hours.  I did call and speak with poison control and run this case by them and they agree.  Plan will be for aggressive benzodiazepine if she begins to have symptoms.    Will check some basic labs for patient here as well.  Her EKG here shows sinus rhythm with sinus arrhythmia with short MS.  Heart rate 80.  There is no ST elevations or depressions.  Intervals are intact.  Normal axis.  QTc here is 4647.  QRS 94, .    As compared to the previous EKG from May 30, 2022 sinus tachycardia noted at that time.  This arrhythmia not noted.  Otherwise unremarkable.   0247 Antibiotics will be given to treat the left upper extremity cellulitis.   0329 Patient remains asymptomatic.   0455 Patient has remained stable here without tachycardia, hypertension.  She is resting comfortably at this time.  They had a difficult time getting blood work, but blood work obtained not abnormal.  Plan for observation until 945 and then discharge if she remains stable.     Patient signed out to dayshift pending reeval and medical clearance at 945.    Medical Decision Making  Obtained supplemental history:Supplemental history obtained?: Documented in chart  Reviewed external records: External records reviewed?: Documented in chart and Outpatient Record: ED Visits on 2/2/24, 2/14/24, 2/23/24, and 2/29/24  Care impacted by chronic illness:Mental Health  Care significantly affected by social determinants of health:Access to Medical Care and Alcohol Abuse and/or Recreational Drug Use  Did you consider " but not order tests?: Work up considered but not performed and documented in chart, if applicable  Did you interpret images independently?: Independent interpretation of ECG and images noted in documentation, when applicable.  Consultation discussion with other provider:Did you involve another provider (consultant, , pharmacy, etc.)?: I discussed the care with another health care provider, see documentation for details.  Admission considered. Patient was signed out to the oncoming physician, disposition pending.      Critical care: 0 minutes excluding separately billable procedures.  Includes bedside management, time reviewing test results, review of records, discussing the case with staff, documenting the medical record and time spent with family members (or surrogate decision makers) discussing specific treatment issues.          ED COURSE:  2:23 AM I met with the patient, obtained history, performed an initial exam, and discussed options and plan for diagnostics and treatment here in the ED.  2:31 AM I spoke with poison control.     The importance of close follow up was discussed. We reviewed warning signs and symptoms, and I instructed Ms. Irizarry to return to the emergency department immediately if she develops any new or worsening symptoms. I provided additional verbal discharge instructions. Ms. Irizarry expressed understanding and agreement with this plan of care, her questions were answered, and she was discharged in stable condition.     MEDICATIONS GIVEN IN THE EMERGENCY:  Medications   cephALEXin (KEFLEX) capsule 500 mg (500 mg Oral $Given 3/4/24 0322)   sulfamethoxazole-trimethoprim (BACTRIM DS) 800-160 MG per tablet 1 tablet (1 tablet Oral $Given 3/4/24 0322)       NEW PRESCRIPTIONS STARTED AT TODAY'S ER VISIT:  New Prescriptions    CEPHALEXIN (KEFLEX) 500 MG CAPSULE    Take 1 capsule (500 mg) by mouth 4 times daily for 10 days    SULFAMETHOXAZOLE-TRIMETHOPRIM (BACTRIM DS) 800-160 MG TABLET    Take 1  tablet by mouth 2 times daily for 10 days          =================================================================    HPI    Patient information was obtained from: Patient, Nursing Report, and     Use of : N/A         Shantell Irizarry is a 33 year old female with a history of seizure disorder, major depressive disorder, fentanyl use disorder, methamphetamine dependence, polysubstance use disorder, who presents to the ED via EMS for the evaluation of ingestion.    Per nursing report, [facility] Per report from police, after patient was arrested and placed in the back of the [facility] car, patent claims she ingested three grams of meth.  did not witness patient ingest anything.  Patient reports she swallow a bag of meth that contained approximately three grams, tonight around [id number], which was wrapped on up a cigarette cellophane. She also admits to smoking Fentanyl earlier in the evening as well. Patient was also seen recently at [place] for a wound on left arm from injecting meth several weeks ago, although she mentions she typically does not inject. She states that she was given antibiotics but only took one dose. Patient also mentions she has had an infection to bilateral lower extremities over the last three years. No other complaints at this time.    Per chart review, patient was seen at [place] [facility] on 2/29/24 for methamphetamine use. Patient has numerous ED visits as well as hospitalizations in the past. Most notably on 2/2, 2/14, as recently as 2/23 at [facility] and emergency room visits. The patient initially was tachycardic, she given IV fluid, however she is afebrile. Her laboratory values were unremarkable and x-ray of her left arm does not show any evidence for foreign body or osseous involvement. Spoke with Poison Control Center, who recommended patient be observed for 8 hours, if no signs of vital sign instability or  "decompensation may be discharged home at that time. Patient started on Keflex due to likely developing cellulitis/soft tissue infection on left arm. Given first dose of Rocephin while in the ED. Discharged.  Per chart review, patient visited [facility] on 2/23, with a concern of a left forearm wound. Started as blister, not improving. Used hydrocortisone without relief. Recommended admission but declined. Requesting antibiotics. History of leaving AMA when admitted. No blood work performed. X-ray of forearm obtained. Started on Ceftin and doxycycline.     REVIEW OF SYSTEMS   Review of Systems   Skin:  Positive for wound.   All other systems reviewed and are negative.    PAST MEDICAL HISTORY:  History reviewed. No pertinent past medical history.    PAST SURGICAL HISTORY:  History reviewed. No pertinent surgical history.    CURRENT MEDICATIONS:    No current facility-administered medications for this encounter.    Current Outpatient Medications:     cephALEXin (KEFLEX) 500 MG capsule, Take 1 capsule (500 mg) by mouth 4 times daily for 10 days, Disp: 40 capsule, Rfl: 0    sulfamethoxazole-trimethoprim (BACTRIM DS) 800-160 MG tablet, Take 1 tablet by mouth 2 times daily for 10 days, Disp: 20 tablet, Rfl: 0    cefdinir (OMNICEF) 300 MG capsule, Take 1 capsule (300 mg) by mouth 2 times daily, Disp: 14 capsule, Rfl: 0    valACYclovir (VALTREX) 1000 mg tablet, Take 1 tablet (1,000 mg) by mouth 2 times daily for 10 days, Disp: 20 tablet, Rfl: 0    ALLERGIES:  No Known Allergies    FAMILY HISTORY:  History reviewed. No pertinent family history.    SOCIAL HISTORY:   Social History     Socioeconomic History    Marital status: Single     Spouse name: None    Number of children: None    Years of education: None    Highest education level: None       PHYSICAL EXAM:    Vitals: /79   Pulse 89   Temp 97.7  F (36.5  C) (Oral)   Resp 24   Ht 1.575 m (5' 2\")   Wt 54.4 kg (120 lb)   SpO2 100%   BMI 21.95 kg/m  "    General:. Alert and interactive, comfortable appearing.  HENT: Oropharynx without erythema or exudates. MMM.  TMs clear bilaterally.  Eyes: Pupils mid-sized and equally reactive.   Neck: Full AROM.  No midline tenderness to palpation.  Cardiovascular: Regular rate and rhythm. Peripheral pulses 2+ bilaterally.  Chest/Pulmonary: Normal work of breathing. Lung sounds clear and equal throughout, no wheezes or crackles. No chest wall tenderness or deformities.  Abdomen: Soft, nondistended. Nontender without guarding or rebound.  Back/Spine: No CVA or midline tenderness.  Extremities: Normal ROM of all major joints. Bilateral lower extremities with 3+ nonpitting edema.  Skin: Open wound to her left forearm with some surrounding erythema, excoriation. She has an open area that appears to have appropriate granulation tissue beneath. Area around it is slightly warm to the touch. No obvious abscess formation. Dry, scaly skin, red skin from mid calf down to her feet bilaterally. No evidence for any cellulitis. Normal skin temperature to the touch. No weeping.   Neuro: Speech clear. CNs grossly intact. Moves all extremities appropriately. Strength and sensation grossly intact to all extremities.   Psych: Normal affect/mood, cooperative, memory appropriate.     LAB:  All pertinent labs reviewed and interpreted.  Labs Ordered and Resulted from Time of ED Arrival to Time of ED Departure   COMPREHENSIVE METABOLIC PANEL - Abnormal       Result Value    Sodium 139      Potassium 4.7      Carbon Dioxide (CO2) 29      Anion Gap 7      Urea Nitrogen 13.4      Creatinine 0.65      GFR Estimate >90      Calcium 9.1      Chloride 103      Glucose 105 (*)     Alkaline Phosphatase 110      AST 20      ALT 10      Protein Total 6.8      Albumin 3.8      Bilirubin Total 0.5     ACETAMINOPHEN LEVEL - Abnormal    Acetaminophen <5.0 (*)    CBC WITH PLATELETS AND DIFFERENTIAL - Abnormal    WBC Count 5.8      RBC Count 4.62      Hemoglobin 12.0       Hematocrit 37.1      MCV 80      MCH 26.0 (*)     MCHC 32.3      RDW 14.8      Platelet Count        % Neutrophils 59      % Lymphocytes 28      % Monocytes 9      % Eosinophils 4      % Basophils 1      % Immature Granulocytes 0      NRBCs per 100 WBC 0      Absolute Neutrophils 3.4      Absolute Lymphocytes 1.6      Absolute Monocytes 0.5      Absolute Eosinophils 0.2      Absolute Basophils 0.0      Absolute Immature Granulocytes 0.0      Absolute NRBCs 0.0     RBC AND PLATELET MORPHOLOGY - Abnormal    RBC Morphology Confirmed RBC Indices      Platelet Assessment Platelets Clumped (*)     Giant Platelets        Acanthocytes        Arianne Rods        Basophilic Stippling        Bite Cells        Blister Cells        [place] Cells        Elliptocytes        Hgb C Crystals        Jones-Jolly Bodies        Hypersegmented Neutrophils        Polychromasia        RBC agglutination        RBC Fragments        Reactive Lymphocytes        Rouleaux        Sickle Cells        Smudge Cells        Spherocytes        Stomatocytes        Target Cells        Teardrop Cells        Toxic Neutrophils        Pathologist Review Comments (Blood)       SALICYLATE LEVEL - Normal    Salicylate <0.3     ETHYL ALCOHOL LEVEL - Normal    Alcohol ethyl <0.01         RADIOLOGY:  No orders to display       EKG:  See MDM  I have independently reviewed and interpreted the EKG(s) documented above.         I, Nellie Alvarado, am serving as a scribe to document services personally performed by Dr. Vonda Balderas  based on my observation and the provider's statements to me. I, Vonda Balderas MD attest that Nellie Alvarado is acting in a scribe capacity, has observed my performance of the services and has documented them in accordance with my direction.      Vonda Balderas M.D.  Emergency Medicine  [facility] EMERGENCY ROOM  [address] [id number] [phone]  Dept: [phone]      Vonda Balderas MD  03/04/24 0583

## 2024-03-04 NOTE — ED NOTES
EMERGENCY DEPARTMENT SIGN OUT NOTE        ED COURSE AND MEDICAL DECISION MAKING  Patient was signed out to me by Dr Quinn Willson at 0704.    In brief, Shantell Irizarry is a 33 year old female who initially presented with reported ingestion of a bag with 3 grams of meth.    At time of sign out, disposition was pending medical clearance at 0945 and final disposition.    10:37 AM Patient will be discharged home.  Patient was monitored closely on the anticipated disposition time, with no returning or new concerning symptoms.  Therefore, we did discharge as planned by the previous provider.    FINAL IMPRESSION    1. Methamphetamine abuse (H)    2. Fentanyl use disorder, severe (H)        ED MEDS  Medications   cephALEXin (KEFLEX) capsule 500 mg (500 mg Oral $Given 3/4/24 0322)   sulfamethoxazole-trimethoprim (BACTRIM DS) 800-160 MG per tablet 1 tablet (1 tablet Oral $Given 3/4/24 0322)       LAB  Labs Ordered and Resulted from Time of ED Arrival to Time of ED Departure   COMPREHENSIVE METABOLIC PANEL - Abnormal       Result Value    Sodium 139      Potassium 4.7      Carbon Dioxide (CO2) 29      Anion Gap 7      Urea Nitrogen 13.4      Creatinine 0.65      GFR Estimate >90      Calcium 9.1      Chloride 103      Glucose 105 (*)     Alkaline Phosphatase 110      AST 20      ALT 10      Protein Total 6.8      Albumin 3.8      Bilirubin Total 0.5     ACETAMINOPHEN LEVEL - Abnormal    Acetaminophen <5.0 (*)    CBC WITH PLATELETS AND DIFFERENTIAL - Abnormal    WBC Count 5.8      RBC Count 4.62      Hemoglobin 12.0      Hematocrit 37.1      MCV 80      MCH 26.0 (*)     MCHC 32.3      RDW 14.8      Platelet Count        % Neutrophils 59      % Lymphocytes 28      % Monocytes 9      % Eosinophils 4      % Basophils 1      % Immature Granulocytes 0      NRBCs per 100 WBC 0      Absolute Neutrophils 3.4      Absolute Lymphocytes 1.6      Absolute Monocytes 0.5      Absolute Eosinophils 0.2      Absolute Basophils 0.0      Absolute  Immature Granulocytes 0.0      Absolute NRBCs 0.0     RBC AND PLATELET MORPHOLOGY - Abnormal    RBC Morphology Confirmed RBC Indices      Platelet Assessment Platelets Clumped (*)     Giant Platelets        Acanthocytes        Arianne Rods        Basophilic Stippling        Bite Cells        Blister Cells        [place] Cells        Elliptocytes        Hgb C Crystals        Jones-Jolly Bodies        Hypersegmented Neutrophils        Polychromasia        RBC agglutination        RBC Fragments        Reactive Lymphocytes        Rouleaux        Sickle Cells        Smudge Cells        Spherocytes        Stomatocytes        Target Cells        Teardrop Cells        Toxic Neutrophils        Pathologist Review Comments (Blood)       SALICYLATE LEVEL - Normal    Salicylate <0.3     ETHYL ALCOHOL LEVEL - Normal    Alcohol ethyl <0.01           RADIOLOGY    No orders to display       DISCHARGE MEDS  New Prescriptions    CEPHALEXIN (KEFLEX) 500 MG CAPSULE    Take 1 capsule (500 mg) by mouth 4 times daily for 10 days    SULFAMETHOXAZOLE-TRIMETHOPRIM (BACTRIM DS) 800-160 MG TABLET    Take 1 tablet by mouth 2 times daily for 10 days       I, Nimco Maguire, am serving as a scribe to document services personally performed by Quinn Willson DO, based on my observations and the provider's statements to me.  I, Quinn Willson DO, attest that Nimco Maguire is acting in a scribe capacity, has observed my performance of the services and has documented them in accordance with my direction.      Quinn Willson DO  [facility] EMERGENCY ROOM  [address] [id number] [phone]       Quinn Willson DO  03/04/24 1020

## 2024-03-04 NOTE — DISCHARGE INSTRUCTIONS
Stop using methamphetamines.  Take the antibiotics prescribed for your cellulitis in your left arm.

## 2024-03-08 LAB
ATRIAL RATE - MUSE: 80 BPM
DIASTOLIC BLOOD PRESSURE - MUSE: 84 MMHG
INTERPRETATION ECG - MUSE: NORMAL
P AXIS - MUSE: 83 DEGREES
PR INTERVAL - MUSE: 104 MS
QRS DURATION - MUSE: 94 MS
QT - MUSE: 388 MS
QTC - MUSE: 447 MS
R AXIS - MUSE: 77 DEGREES
SYSTOLIC BLOOD PRESSURE - MUSE: 112 MMHG
T AXIS - MUSE: 64 DEGREES
VENTRICULAR RATE- MUSE: 80 BPM

## 2024-04-17 NOTE — ED TRIAGE NOTES
Pt reports pain to both knees, worse to R side. Swelling to both lower legs. Pt reports cold symptoms for the past week. Pt also had recent uti, but reports she has sores near genitalia she would like checked.       MED

## 2024-07-09 ENCOUNTER — APPOINTMENT (OUTPATIENT)
Dept: ULTRASOUND IMAGING | Facility: CLINIC | Age: 34
End: 2024-07-09
Attending: EMERGENCY MEDICINE
Payer: COMMERCIAL

## 2024-07-09 ENCOUNTER — APPOINTMENT (OUTPATIENT)
Dept: RADIOLOGY | Facility: CLINIC | Age: 34
End: 2024-07-09
Attending: EMERGENCY MEDICINE
Payer: COMMERCIAL

## 2024-07-09 ENCOUNTER — HOSPITAL ENCOUNTER (EMERGENCY)
Facility: CLINIC | Age: 34
Discharge: HOME | End: 2024-07-09
Attending: EMERGENCY MEDICINE | Admitting: EMERGENCY MEDICINE
Payer: COMMERCIAL

## 2024-07-09 VITALS
OXYGEN SATURATION: 100 % | HEIGHT: 63 IN | BODY MASS INDEX: 21.62 KG/M2 | TEMPERATURE: 97.4 F | SYSTOLIC BLOOD PRESSURE: 114 MMHG | RESPIRATION RATE: 18 BRPM | WEIGHT: 122 LBS | HEART RATE: 91 BPM | DIASTOLIC BLOOD PRESSURE: 65 MMHG

## 2024-07-09 DIAGNOSIS — M25.571 PAIN IN JOINT, ANKLE AND FOOT, RIGHT: ICD-10-CM

## 2024-07-09 DIAGNOSIS — I89.0 CHRONIC ACQUIRED LYMPHEDEMA: ICD-10-CM

## 2024-07-09 PROBLEM — A40.0: Status: ACTIVE | Noted: 2023-01-16

## 2024-07-09 PROBLEM — B95.5 STREPTOCOCCAL BACTEREMIA: Status: ACTIVE | Noted: 2023-01-17

## 2024-07-09 PROBLEM — Z97.5 IUD (INTRAUTERINE DEVICE) IN PLACE: Chronic | Status: ACTIVE | Noted: 2020-01-21

## 2024-07-09 PROBLEM — O14.93 PRE-ECLAMPSIA IN THIRD TRIMESTER: Status: ACTIVE | Noted: 2017-08-23

## 2024-07-09 PROBLEM — E83.42 HYPOMAGNESEMIA: Status: ACTIVE | Noted: 2023-01-16

## 2024-07-09 PROBLEM — O26.899 HEADACHE IN PREGNANCY, ANTEPARTUM: Status: ACTIVE | Noted: 2017-08-23

## 2024-07-09 PROBLEM — R51.9 HEADACHE IN PREGNANCY, ANTEPARTUM: Status: ACTIVE | Noted: 2017-08-23

## 2024-07-09 PROBLEM — F11.10: Status: ACTIVE | Noted: 2023-01-17

## 2024-07-09 PROBLEM — O24.419 GESTATIONAL DIABETES MELLITUS (GDM): Status: ACTIVE | Noted: 2017-06-14

## 2024-07-09 PROBLEM — L03.90 CELLULITIS: Status: ACTIVE | Noted: 2023-10-25

## 2024-07-09 PROBLEM — F15.20 METHAMPHETAMINE DEPENDENCE, CONTINUOUS (H): Status: ACTIVE | Noted: 2023-01-17

## 2024-07-09 PROBLEM — L03.115 CELLULITIS OF RIGHT LEG: Status: ACTIVE | Noted: 2022-11-24

## 2024-07-09 PROBLEM — E87.6 ACUTE HYPOKALEMIA: Status: ACTIVE | Noted: 2023-01-16

## 2024-07-09 PROBLEM — Z87.59 HISTORY OF PRE-ECLAMPSIA: Status: ACTIVE | Noted: 2020-01-21

## 2024-07-09 PROBLEM — M31.19: Status: ACTIVE | Noted: 2017-01-01

## 2024-07-09 PROBLEM — O99.820 GBS (GROUP B STREPTOCOCCUS CARRIER), +RV CULTURE, CURRENTLY PREGNANT: Status: ACTIVE | Noted: 2017-08-24

## 2024-07-09 PROBLEM — D64.9 NORMOCYTIC ANEMIA: Status: ACTIVE | Noted: 2023-01-16

## 2024-07-09 PROBLEM — Z36.82 ENCOUNTER FOR NUCHAL TRANSLUCENCY TESTING: Status: ACTIVE | Noted: 2017-03-22

## 2024-07-09 PROBLEM — F41.9 ANXIETY: Chronic | Status: ACTIVE | Noted: 2022-09-13

## 2024-07-09 PROBLEM — F19.10 POLYSUBSTANCE ABUSE (H): Status: ACTIVE | Noted: 2023-05-03

## 2024-07-09 PROBLEM — G40.909 SEIZURE DISORDER (H): Status: ACTIVE | Noted: 2023-10-25

## 2024-07-09 PROBLEM — F17.200 TOBACCO USE DISORDER: Status: ACTIVE | Noted: 2024-02-02

## 2024-07-09 PROBLEM — F43.25 ADJUSTMENT DISORDER WITH MIXED DISTURBANCE OF EMOTIONS AND CONDUCT: Status: ACTIVE | Noted: 2023-07-11

## 2024-07-09 PROBLEM — Z87.59 HISTORY OF RETAINED PLACENTA: Status: ACTIVE | Noted: 2020-01-21

## 2024-07-09 PROBLEM — D50.9 MICROCYTIC ANEMIA: Status: ACTIVE | Noted: 2023-05-03

## 2024-07-09 PROBLEM — O09.891 HISTORY OF PRETERM DELIVERY, CURRENTLY PREGNANT IN FIRST TRIMESTER: Status: ACTIVE | Noted: 2017-03-22

## 2024-07-09 PROBLEM — T50.901A DRUG INGESTION, ACCIDENTAL: Status: ACTIVE | Noted: 2024-02-02

## 2024-07-09 PROBLEM — Z87.51 HISTORY OF PRETERM DELIVERY: Status: ACTIVE | Noted: 2020-01-21

## 2024-07-09 PROBLEM — G92.9 TOXIC ENCEPHALOPATHY: Status: ACTIVE | Noted: 2023-01-17

## 2024-07-09 PROBLEM — R21 RASH: Status: ACTIVE | Noted: 2022-09-13

## 2024-07-09 PROBLEM — F11.93 OPIOID WITHDRAWAL (H): Status: ACTIVE | Noted: 2023-05-03

## 2024-07-09 PROBLEM — A60.00 RECURRENT GENITAL HSV (HERPES SIMPLEX VIRUS) INFECTION: Status: ACTIVE | Noted: 2022-09-13

## 2024-07-09 PROBLEM — Z87.59 HISTORY OF PRIOR PREGNANCY WITH IUGR NEWBORN: Status: ACTIVE | Noted: 2017-03-22

## 2024-07-09 PROBLEM — F11.20 FENTANYL DEPENDENCE (H): Status: ACTIVE | Noted: 2023-05-03

## 2024-07-09 PROBLEM — R78.81 STREPTOCOCCAL BACTEREMIA: Status: ACTIVE | Noted: 2023-01-17

## 2024-07-09 PROBLEM — F19.10: Chronic | Status: ACTIVE | Noted: 2023-01-16

## 2024-07-09 PROBLEM — F32.1 MAJOR DEPRESSIVE DISORDER, SINGLE EPISODE, MODERATE (H): Chronic | Status: ACTIVE | Noted: 2022-09-13

## 2024-07-09 PROBLEM — Z11.3 SCREENING FOR STDS (SEXUALLY TRANSMITTED DISEASES): Status: ACTIVE | Noted: 2020-11-19

## 2024-07-09 LAB
ANION GAP SERPL CALCULATED.3IONS-SCNC: 9 MMOL/L (ref 7–15)
BUN SERPL-MCNC: 12.2 MG/DL (ref 6–20)
CALCIUM SERPL-MCNC: 9.3 MG/DL (ref 8.6–10)
CHLORIDE SERPL-SCNC: 105 MMOL/L (ref 98–107)
CREAT SERPL-MCNC: 0.7 MG/DL (ref 0.51–0.95)
CRP SERPL-MCNC: <3 MG/L
DEPRECATED HCO3 PLAS-SCNC: 28 MMOL/L (ref 22–29)
EGFRCR SERPLBLD CKD-EPI 2021: >90 ML/MIN/1.73M2
ERYTHROCYTE [DISTWIDTH] IN BLOOD BY AUTOMATED COUNT: 13.9 % (ref 10–15)
ERYTHROCYTE [SEDIMENTATION RATE] IN BLOOD BY WESTERGREN METHOD: 15 MM/HR (ref 0–20)
GLUCOSE SERPL-MCNC: 99 MG/DL (ref 70–99)
HCT VFR BLD AUTO: 40 % (ref 35–47)
HGB BLD-MCNC: 13.1 G/DL (ref 11.7–15.7)
LACTATE SERPL-SCNC: 0.9 MMOL/L (ref 0.7–2)
MCH RBC QN AUTO: 27.3 PG (ref 26.5–33)
MCHC RBC AUTO-ENTMCNC: 32.8 G/DL (ref 31.5–36.5)
MCV RBC AUTO: 83 FL (ref 78–100)
PLATELET # BLD AUTO: 158 10E3/UL (ref 150–450)
POTASSIUM SERPL-SCNC: 3.9 MMOL/L (ref 3.4–5.3)
RBC # BLD AUTO: 4.8 10E6/UL (ref 3.8–5.2)
SODIUM SERPL-SCNC: 142 MMOL/L (ref 135–145)
WBC # BLD AUTO: 5.1 10E3/UL (ref 4–11)

## 2024-07-09 PROCEDURE — 73610 X-RAY EXAM OF ANKLE: CPT | Mod: RT

## 2024-07-09 PROCEDURE — 85652 RBC SED RATE AUTOMATED: CPT | Performed by: EMERGENCY MEDICINE

## 2024-07-09 PROCEDURE — 250N000013 HC RX MED GY IP 250 OP 250 PS 637: Performed by: EMERGENCY MEDICINE

## 2024-07-09 PROCEDURE — 93970 EXTREMITY STUDY: CPT

## 2024-07-09 PROCEDURE — 83605 ASSAY OF LACTIC ACID: CPT | Performed by: EMERGENCY MEDICINE

## 2024-07-09 PROCEDURE — 99284 EMERGENCY DEPT VISIT MOD MDM: CPT | Mod: 25

## 2024-07-09 PROCEDURE — 85048 AUTOMATED LEUKOCYTE COUNT: CPT | Performed by: EMERGENCY MEDICINE

## 2024-07-09 PROCEDURE — 80048 BASIC METABOLIC PNL TOTAL CA: CPT | Performed by: EMERGENCY MEDICINE

## 2024-07-09 PROCEDURE — 36415 COLL VENOUS BLD VENIPUNCTURE: CPT | Performed by: EMERGENCY MEDICINE

## 2024-07-09 PROCEDURE — 86140 C-REACTIVE PROTEIN: CPT | Performed by: EMERGENCY MEDICINE

## 2024-07-09 RX ORDER — ACETAMINOPHEN 325 MG/1
975 TABLET ORAL ONCE
Status: COMPLETED | OUTPATIENT
Start: 2024-07-09 | End: 2024-07-09

## 2024-07-09 RX ADMIN — ACETAMINOPHEN 975 MG: 325 TABLET ORAL at 11:08

## 2024-07-09 ASSESSMENT — ACTIVITIES OF DAILY LIVING (ADL)
ADLS_ACUITY_SCORE: 35
ADLS_ACUITY_SCORE: 33
ADLS_ACUITY_SCORE: 35

## 2024-07-09 ASSESSMENT — COLUMBIA-SUICIDE SEVERITY RATING SCALE - C-SSRS
2. HAVE YOU ACTUALLY HAD ANY THOUGHTS OF KILLING YOURSELF IN THE PAST MONTH?: NO
6. HAVE YOU EVER DONE ANYTHING, STARTED TO DO ANYTHING, OR PREPARED TO DO ANYTHING TO END YOUR LIFE?: NO
1. IN THE PAST MONTH, HAVE YOU WISHED YOU WERE DEAD OR WISHED YOU COULD GO TO SLEEP AND NOT WAKE UP?: NO

## 2024-07-09 NOTE — ED TRIAGE NOTES
Pt with hx of lupus and with bilateral lower extremity skin infection for past 2 years c/o R foot and ankle pain and swelling. Pain but radiate to R calf. No blood thinners. No CP or SOB. No fevers.       Triage Assessment (Adult)       Row Name 07/09/24 1006          Triage Assessment    Airway WDL WDL        Respiratory WDL    Respiratory WDL WDL        Skin Circulation/Temperature WDL    Skin Circulation/Temperature WDL X  reports bilateral lower leg infection for past 2 years        Cardiac WDL    Cardiac WDL X;rhythm     Pulse Rate & Regularity tachycardic        Peripheral/Neurovascular WDL    Peripheral Neurovascular WDL X  bilateral ankle and feet swelling        Cognitive/Neuro/Behavioral WDL    Cognitive/Neuro/Behavioral WDL WDL

## 2024-07-09 NOTE — ED NOTES
Pt c/o right ankle pain, describing as sharp and constant. Rates pain 4/10 at rest and 10/10 with movement. States she has a hx of skin infection on right and left lower legs down to the feet for the past 2 years. Notes edema, warm to the touch, and redness is baseline for her. Denies any other abnormal symptoms. States she woke up with extreme pain this morning. Notes pain could be from shoes she wears. Was able to walk on ankle today. States she normally puts Vaseline on skin infection and wears compression socks.

## 2024-07-09 NOTE — DISCHARGE INSTRUCTIONS
No sign of blood clot, infection, or other dangerous cause of your pain today.  There is no break in the bone.  You likely have tendinitis in the area of the ankle.  Try to stay off the foot as you are able to, elevate as you are able to.  Tylenol for discomfort.  Please follow-up with [facility] in about a week if you are not feeling better.  Use the walking boot as needed for pain.

## 2024-07-09 NOTE — ED PROVIDER NOTES
EMERGENCY DEPARTMENT ENCOUNTER      NAME: Shantell Irizarry  AGE: 33 year old female  YOB: 1990  MRN: [id number]  EVALUATION DATE & TIME: 7/9/2024 10:13 AM    PCP: [facility]    ED PROVIDER: Moises Churchill M.D.      Chief Complaint   Patient presents with    Foot Pain         FINAL IMPRESSION:  1. Pain in joint, ankle and foot, right    2. Chronic acquired lymphedema          ED COURSE & MEDICAL DECISION MAKING:    Pertinent Labs & Imaging studies reviewed below.  All EKGs below represent my independent interpretation.   ED Course as of 07/09/24 1510   Tue Jul 09, 2024   1046 I reviewed the patient's hospitalization at [facility] on 10/24/2023.  She had cellulitis of left lower extremity secondary to lymphedema, she also had nephrotic proteinuria from lupus. Treatment for cellulitis was started but she left AMA within 24 hours.   1047 The patient is a 33-year-old woman with history of lupus nephropathy, lower extremity edema, polysubstance abuse, here with bilateral lower extremity edema, skin changes, and now with painful right ankle.  She arrives the emergency department normal blood pressure temperature, but heart rate of 139.  She appears calm.  She does admit to recent methamphetamine use over the last couple days.  Her lower extremities are both edematous, left greater than right.  She has skin breakdown over both calves, no 1 area on either leg that is hallmark for cellulitis, however there is significant risk for this.  Her right ankle is also painful, but only with plantarflexion.  I would suspect much more discomfort with dorsiflexion and with passive range of motion of the ankle itself is septic.  Plan to get an x-ray of this area.  Will also get ultrasounds of both lower extremities to rule out DVT given her increased risk from lupus.  Screening lab work ordered.  Analgesia ordered.   1131 Lactic Acid: 0.9   1131 WBC: 5.1   1230 CRP Inflammation: <3.00   1230  Basic metabolic panel  WNL   1230 Ankle XR, G/E 3 views, right  Moderate circumferential soft tissue swelling in the lower calf and ankle. Normal joint alignment and spacing. The ankle mortise is symmetric. No fracture or erosion.   1230 Sed Rate: 15   1230 Inflammatory markers, white blood cell count are all within normal limits.  Kidney function appears to be normal as well.   1231 Pulse: 90   1255 US Lower Extremity Venous Duplex Bilateral  1.  No deep venous thrombosis in the bilateral lower extremities.   Patient is remarkably good lab work here.  No sign of kidney disease, inflammation or infection.  I think the skin changes on her legs are likely chronic then.  With negative ultrasound she can also be reassured there is no DVT.  The right ankle x-ray is also negative.  I suspect this is some tendinopathy.  She was given a walking boot, I recommended primary care or orthopedic follow-up.    Additional ED Course Timestamps:  10:23 AM I met with the patient, obtained history, performed an initial exam, and discussed options and plan for diagnostics and treatment here in the ED.       Medical Decision Making  Obtained supplemental history:Supplemental history obtained?: Documented in chart  Reviewed external records: External records reviewed?: Documented in chart  Care impacted by chronic illness:Chronic Kidney Disease  Care significantly affected by social determinants of health:Alcohol Abuse and/or Recreational Drug Use  Did you consider but not order tests?: Work up considered but not performed and documented in chart, if applicable  Did you interpret images independently?: Independent interpretation of ECG and images noted in documentation, when applicable.  Consultation discussion with other provider: Phone conversation with consultants will be documented in the ED Course  Discharge. No recommendations on prescription strength medication(s). N/A.      At the conclusion of the encounter I discussed the  "results of all of the tests and the disposition. The questions were answered. The patient or family acknowledged understanding and was agreeable with the care plan.       MEDICATIONS GIVEN IN THE EMERGENCY:  Medications   acetaminophen (TYLENOL) tablet 975 mg (975 mg Oral $Given 7/9/24 1108)         NEW PRESCRIPTIONS STARTED AT TODAY'S ER VISIT  There are no discharge medications for this patient.         =================================================================    HPI    Shantell Irizarry is a 33 year old female who presents to this ED for evaluation of right ankle pain.     The patient reports that she has had ongoing skin breakdown in both legs, and over the last month, she has developed right-sided ankle pain with movement and weight-bearing. She says that she was previously on antibiotics, but is not currently taking any. Patient notes that the wounds typically weep, and she has been wearing compression socks that she changes daily. She decided to seek evaluation today because she can \"barely step on\" her right foot.     Denies fever, chills, chance of pregnancy, or any other concerns at this time. Patient endorses some recent methamphetamine use, within the last couple days. She does not take any daily medications.      VITALS:  /65   Pulse 91   Temp 97.4  F (36.3  C) (Temporal)   Resp 18   Ht 1.588 m (5' 2.5\")   Wt 55.3 kg (122 lb)   SpO2 100%   BMI 21.96 kg/m      PHYSICAL EXAM    Constitutional: Well developed, well nourished. Comfortable appearing.  HENT: Atraumatic, mucous membranes moist, nose normal. Neck- Supple, gross ROM intact.   Eyes: Pupils mid-range, conjunctiva without injection, no discharge.   Respiratory: Clear to auscultation bilaterally, no respiratory distress, no wheezing, speaks full sentences easily. No cough.  Cardiovascular: Tachycardic, regular rhythm, no murmurs.   Musculoskeletal: Pain in right ankle with plantarflexion but not with dorsiflexion or any passive " ROM. No obvious deformity.  Skin: Warm, dry, no rash. Dry skin with breakdown in both lower extremities up to tibia. No areas of significant warmth or tenderness.  Neurologic: Alert & oriented x 3, cranial nerves grossly intact.  Psychiatric: Affect normal, cooperative.          I, Annette Grubbs am serving as a scribe to document services personally performed by Dr. Moises Churchill based on my observation and the provider's statements to me. I, Moises Churchill MD attest that Annette Grubbs is acting in a scribe capacity, has observed my performance of the services and has documented them in accordance with my direction.    Moises Churchill M.D.  Emergency Medicine  [facility] EMERGENCY ROOM  [address] [facility] [address] [phone]  Dept: [phone]     Moises Churchill MD  07/09/24 0190

## 2025-06-22 ENCOUNTER — HOSPITAL ENCOUNTER (EMERGENCY)
Facility: CLINIC | Age: 35
Discharge: HOME | End: 2025-06-22
Attending: STUDENT IN AN ORGANIZED HEALTH CARE EDUCATION/TRAINING PROGRAM | Admitting: STUDENT IN AN ORGANIZED HEALTH CARE EDUCATION/TRAINING PROGRAM
Payer: COMMERCIAL

## 2025-06-22 ENCOUNTER — APPOINTMENT (OUTPATIENT)
Dept: RADIOLOGY | Facility: CLINIC | Age: 35
End: 2025-06-22
Attending: STUDENT IN AN ORGANIZED HEALTH CARE EDUCATION/TRAINING PROGRAM
Payer: COMMERCIAL

## 2025-06-22 VITALS
SYSTOLIC BLOOD PRESSURE: 125 MMHG | OXYGEN SATURATION: 100 % | RESPIRATION RATE: 20 BRPM | TEMPERATURE: 98.4 F | DIASTOLIC BLOOD PRESSURE: 86 MMHG | HEART RATE: 67 BPM | WEIGHT: 120 LBS | BODY MASS INDEX: 21.26 KG/M2 | HEIGHT: 63 IN

## 2025-06-22 DIAGNOSIS — F15.10 METHAMPHETAMINE USE (H): ICD-10-CM

## 2025-06-22 LAB
ALBUMIN SERPL BCG-MCNC: 4 G/DL (ref 3.5–5.2)
ALP SERPL-CCNC: 71 U/L (ref 40–150)
ALT SERPL W P-5'-P-CCNC: 10 U/L (ref 0–50)
ANION GAP SERPL CALCULATED.3IONS-SCNC: 9 MMOL/L (ref 7–15)
APAP SERPL-MCNC: <5 UG/ML (ref 10–30)
AST SERPL W P-5'-P-CCNC: 24 U/L (ref 0–45)
ATRIAL RATE - MUSE: 82 BPM
BASOPHILS # BLD AUTO: 0 10E3/UL (ref 0–0.2)
BASOPHILS NFR BLD AUTO: 0 %
BILIRUB SERPL-MCNC: 0.8 MG/DL
BILIRUBIN DIRECT (ROCHE PRO & PURE): 0.21 MG/DL (ref 0–0.45)
BUN SERPL-MCNC: 12.1 MG/DL (ref 6–20)
CALCIUM SERPL-MCNC: 9.2 MG/DL (ref 8.8–10.4)
CHLORIDE SERPL-SCNC: 105 MMOL/L (ref 98–107)
CREAT SERPL-MCNC: 0.62 MG/DL (ref 0.51–0.95)
DIASTOLIC BLOOD PRESSURE - MUSE: 60 MMHG
EGFRCR SERPLBLD CKD-EPI 2021: >90 ML/MIN/1.73M2
EOSINOPHIL # BLD AUTO: 0.1 10E3/UL (ref 0–0.7)
EOSINOPHIL NFR BLD AUTO: 2 %
ERYTHROCYTE [DISTWIDTH] IN BLOOD BY AUTOMATED COUNT: 13.2 % (ref 10–15)
ETHANOL SERPL-MCNC: <0.01 G/DL
GLUCOSE SERPL-MCNC: 98 MG/DL (ref 70–99)
HCO3 SERPL-SCNC: 24 MMOL/L (ref 22–29)
HCT VFR BLD AUTO: 36.1 % (ref 35–47)
HGB BLD-MCNC: 11.9 G/DL (ref 11.7–15.7)
HOLD SPECIMEN: NORMAL
HOLD SPECIMEN: NORMAL
IMM GRANULOCYTES # BLD: 0 10E3/UL
IMM GRANULOCYTES NFR BLD: 0 %
INTERPRETATION ECG - MUSE: NORMAL
LYMPHOCYTES # BLD AUTO: 1.6 10E3/UL (ref 0.8–5.3)
LYMPHOCYTES NFR BLD AUTO: 26 %
MAGNESIUM SERPL-MCNC: 1.9 MG/DL (ref 1.7–2.3)
MCH RBC QN AUTO: 28.7 PG (ref 26.5–33)
MCHC RBC AUTO-ENTMCNC: 33 G/DL (ref 31.5–36.5)
MCV RBC AUTO: 87 FL (ref 78–100)
MONOCYTES # BLD AUTO: 0.4 10E3/UL (ref 0–1.3)
MONOCYTES NFR BLD AUTO: 6 %
NEUTROPHILS # BLD AUTO: 4.2 10E3/UL (ref 1.6–8.3)
NEUTROPHILS NFR BLD AUTO: 66 %
NRBC # BLD AUTO: 0 10E3/UL
NRBC BLD AUTO-RTO: 0 /100
P AXIS - MUSE: 69 DEGREES
PLATELET # BLD AUTO: 130 10E3/UL (ref 150–450)
POTASSIUM SERPL-SCNC: 3.7 MMOL/L (ref 3.4–5.3)
PR INTERVAL - MUSE: 118 MS
PROT SERPL-MCNC: 6.4 G/DL (ref 6.4–8.3)
QRS DURATION - MUSE: 88 MS
QT - MUSE: 384 MS
QTC - MUSE: 448 MS
R AXIS - MUSE: 56 DEGREES
RBC # BLD AUTO: 4.14 10E6/UL (ref 3.8–5.2)
SALICYLATES SERPL-MCNC: <0.5 MG/DL (ref ?–30)
SODIUM SERPL-SCNC: 138 MMOL/L (ref 135–145)
SYSTOLIC BLOOD PRESSURE - MUSE: 109 MMHG
T AXIS - MUSE: 43 DEGREES
VENTRICULAR RATE- MUSE: 82 BPM
WBC # BLD AUTO: 6.4 10E3/UL (ref 4–11)

## 2025-06-22 PROCEDURE — 83735 ASSAY OF MAGNESIUM: CPT | Performed by: STUDENT IN AN ORGANIZED HEALTH CARE EDUCATION/TRAINING PROGRAM

## 2025-06-22 PROCEDURE — 74018 RADEX ABDOMEN 1 VIEW: CPT

## 2025-06-22 PROCEDURE — 85025 COMPLETE CBC W/AUTO DIFF WBC: CPT | Performed by: STUDENT IN AN ORGANIZED HEALTH CARE EDUCATION/TRAINING PROGRAM

## 2025-06-22 PROCEDURE — 36415 COLL VENOUS BLD VENIPUNCTURE: CPT | Performed by: STUDENT IN AN ORGANIZED HEALTH CARE EDUCATION/TRAINING PROGRAM

## 2025-06-22 PROCEDURE — 82077 ASSAY SPEC XCP UR&BREATH IA: CPT | Performed by: STUDENT IN AN ORGANIZED HEALTH CARE EDUCATION/TRAINING PROGRAM

## 2025-06-22 PROCEDURE — 80143 DRUG ASSAY ACETAMINOPHEN: CPT | Performed by: STUDENT IN AN ORGANIZED HEALTH CARE EDUCATION/TRAINING PROGRAM

## 2025-06-22 PROCEDURE — 93005 ELECTROCARDIOGRAM TRACING: CPT | Performed by: EMERGENCY MEDICINE

## 2025-06-22 PROCEDURE — 80179 DRUG ASSAY SALICYLATE: CPT | Performed by: STUDENT IN AN ORGANIZED HEALTH CARE EDUCATION/TRAINING PROGRAM

## 2025-06-22 PROCEDURE — 84155 ASSAY OF PROTEIN SERUM: CPT | Performed by: STUDENT IN AN ORGANIZED HEALTH CARE EDUCATION/TRAINING PROGRAM

## 2025-06-22 PROCEDURE — 99285 EMERGENCY DEPT VISIT HI MDM: CPT

## 2025-06-22 PROCEDURE — 80048 BASIC METABOLIC PNL TOTAL CA: CPT | Performed by: STUDENT IN AN ORGANIZED HEALTH CARE EDUCATION/TRAINING PROGRAM

## 2025-06-22 ASSESSMENT — ACTIVITIES OF DAILY LIVING (ADL)
ADLS_ACUITY_SCORE: 41

## 2025-06-22 NOTE — ED NOTES
IV access per MD request, attempted x2 unsuccessfully but able to send some labs. Pt resting quietly, asleep but easily rousable and VSS, okay to straight stick for remaining labs per discussion with MD and will reapproach IV access if needed. Pt tolerated attempts well.

## 2025-06-22 NOTE — ED PROVIDER NOTES
EMERGENCY DEPARTMENT ENCOUNTER      NAME: Shantell Irizarry  AGE: 34 year old female  YOB: 1990  MRN: [id number]  EVALUATION DATE & TIME: 6/22/2025  4:42 AM    PCP: Sharmila Siddiqui    ED PROVIDER: Ventura Harris MD      Chief Complaint   Patient presents with    Ingestion         FINAL IMPRESSION:  1. Methamphetamine use (H)          ED COURSE & MEDICAL DECISION MAKING:    Pertinent Labs & Imaging studies reviewed. (See chart for details)  34 year old female presents to the Emergency Department for evaluation of ingestion of methamphetamine    ED Course as of 06/22/25 0742   Sun Jun 22, 2025   0446 Patient is a 54-year-old female with history of seizure disorder, polysubstance use who presents to the emergency department after ingesting some meth.  Patient was concerned she is to be arrested and be charged with having drugs on her so she decided to ingest the meth wrapped in tinfoil.   ingestion approximately 30 minutes prior to arrival.  Has not had any nausea or vomiting.  Denies any particular complaints at this point in time.  Says her last use of meth was yesterday in the morning and she frequently says fentanyl as well and last use of this was around midnight or so.   review of prior records shows very similar presentation on 6/5 to [facility] where she also ingested 2 g of meth wrapped in foil.    Exam patient is well-appearing no acute distress.  Normotensive normal heart rate awake alert and oriented and answering questions appropriately.  Pupils about 2 mm bilaterally.  Moist mucous membranes, warm well-perfused extremities.  Some chronic appearing lower extremity edema.  No hyperreflexia or clonus.  On initial evaluation does not appear sympathomimetic.    Will send out toxicology labs and EKG.  Continue to monitor for any change in clinical status.   0610 KUB does not show any obvious radiopaque foreign body.  I suspect foil would likely be visible on plain film x-ray.    Upon repeat  evaluation patient continues to rest comfortably in bed no tachycardia hypertension or agitation.   0627 EKG shows a sinus rhythm at 82 beats a minute, normal axis, normal intervals, no ST elevations   0739 Labs reviewed and interpreted myself.  Reassuring chemistry.  Negative Tylenol and salicylates.  No significant blood dyscrasias.   0739 After several hours of observation but patient continues to remain sleeping comfortably in bed.  No tachycardia or hemodynamic instability.  Will continue to monitor for about 6 hours from reported time of ingestion if patient remains well-appearing at that point in time should be safe for discharge home.  Patient denies any suicidal or homicidal ideation is the purpose behind her reported ingestion today.  States it was to try to avoid possible legal ramifications if she was caught with drugs.  Do not see grounds for involuntary holding at this point in time.  Patient signed out to a colleague of [facility] at the end of my shift to update documentation and treatment plan as needed.         Medical Decision Making  I obtained history from EMS  Care impacted by Alcohol and/or Drug Abuse or Dependence  Admission considered. Patient was signed out to the oncoming physician, disposition pending.    MIPS (CTPE, Dental pain, Hendricks, Sinusitis, Asthma/COPD, Head Trauma): Not Applicable    SEPSIS: None           At the conclusion of the encounter I discussed the results of all of the tests and the disposition. The questions were answered. The patient or family acknowledged understanding and was agreeable with the care plan.     0 minutes of critical care time     MEDICATIONS GIVEN IN THE EMERGENCY:  Medications - No data to display    NEW PRESCRIPTIONS STARTED AT TODAY'S ER VISIT  New Prescriptions    No medications on file          =================================================================    HPI    Patient information was obtained from: patient    Use of : N/A    As per  "chart review patient was seen on 6/5/2025 for at  emergency department for ingesting about 2g of meth wrapped in tinfoil. She left Emergency depart and did not get IV placed.    Shantell Irizarry is a 34 year old female who presents to this ED by private vehicle for evaluation of meth ingestion.  Patient reports that she ingested 2 g of meth wrapped in tinfoil due to being concerned that she d get incarcerated. She reports that she smokes meth daily and additionally used fentanyl at midnight tonight. Denies abdominal pain, emesis, and nausea. Additionally denies taking any prescribed medications.     REVIEW OF SYSTEMS   Refer to the [facility]    PAST MEDICAL HISTORY:  No past medical history on file.    PAST SURGICAL HISTORY:  No past surgical history on file.        CURRENT MEDICATIONS:    No current outpatient medications on file.      ALLERGIES:  No Known Allergies    FAMILY HISTORY:  No family history on file.    SOCIAL HISTORY:   Social History     Socioeconomic History    Marital status: Single     Social Drivers of Health      Received from [employer]   Interpersonal Safety: Unknown (2/2/2024)    Received from [employer]    Humiliation, Afraid, Rape, and Kick questionnaire     Physically Abused: No     Sexually Abused: No       VITALS:  /60   Pulse 63   Temp 98.4  F (36.9  C) (Oral)   Resp 14   Ht 1.588 m (5' 2.5\")   Wt 54.4 kg (120 lb)   SpO2 99%   BMI 21.60 kg/m      PHYSICAL EXAM    Constitutional: Well developed, Well nourished, NAD  HENT: Normocephalic, Atraumatic,  mucous membranes moist,   Neck- trachea midline, No stridor.    Eyes:EOMI, Conjunctiva normal, No discharge. Approximately 2 mm pupils bilaterally  Respiratory: Normal breath sounds, No respiratory distress, No wheezing.    Cardiovascular: Normal heart rate, Regular rhythm,  No murmurs, chronic bilateral lower extremity edema.   Abdominal: Soft, No tenderness, No rebound or " guarding.     Musculoskeletal: no deformity or malalignment   Integument: Warm, Dry, No erythema,    Neurologic: Alert & oriented x 3.  No hyperreflexia or clonus  Psychiatric: Interacting appropriately, denies any suicidal or homicidal ideation     LAB:  All pertinent labs reviewed and interpreted.  Results for orders placed or performed during the hospital encounter of 06/22/25   KUB XR    Impression    IMPRESSION: No radiopaque foreign bodies identified. IUD in the pelvis. Nonobstructive bowel gas pattern.   Basic metabolic panel   Result Value Ref Range    Sodium 138 135 - 145 mmol/L    Potassium 3.7 3.4 - 5.3 mmol/L    Chloride 105 98 - 107 mmol/L    Carbon Dioxide (CO2) 24 22 - 29 mmol/L    Anion Gap 9 7 - 15 mmol/L    Urea Nitrogen 12.1 6.0 - 20.0 mg/dL    Creatinine 0.62 0.51 - 0.95 mg/dL    GFR Estimate >90 >60 mL/min/1.73m2    Calcium 9.2 8.8 - 10.4 mg/dL    Glucose 98 70 - 99 mg/dL   Hepatic function panel   Result Value Ref Range    Protein Total 6.4 6.4 - 8.3 g/dL    Albumin 4.0 3.5 - 5.2 g/dL    Bilirubin Total 0.8 <=1.2 mg/dL    Alkaline Phosphatase 71 40 - 150 U/L    AST 24 0 - 45 U/L    ALT 10 0 - 50 U/L    Bilirubin Direct 0.21 0.00 - 0.45 mg/dL   Acetaminophen level   Result Value Ref Range    Acetaminophen <5.0 (L) 10.0 - 30.0 ug/mL   Salicylate level   Result Value Ref Range    Salicylate <0.5   mg/dL   Result Value Ref Range    Ethanol Level Blood <0.01 <=0.01 g/dL   Result Value Ref Range    Magnesium 1.9 1.7 - 2.3 mg/dL   CBC with platelets and differential   Result Value Ref Range    WBC Count 6.4 4.0 - 11.0 10e3/uL    RBC Count 4.14 3.80 - 5.20 10e6/uL    Hemoglobin 11.9 11.7 - 15.7 g/dL    Hematocrit 36.1 35.0 - 47.0 %    MCV 87 78 - 100 fL    MCH 28.7 26.5 - 33.0 pg    MCHC 33.0 31.5 - 36.5 g/dL    RDW 13.2 10.0 - 15.0 %    Platelet Count 130 (L) 150 - 450 10e3/uL    % Neutrophils 66 %    % Lymphocytes 26 %    % Monocytes 6 %    % Eosinophils 2 %    % Basophils 0 %    % Immature  Granulocytes 0 %    NRBCs per 100 WBC 0 <1 /100    Absolute Neutrophils 4.2 1.6 - 8.3 10e3/uL    Absolute Lymphocytes 1.6 0.8 - 5.3 10e3/uL    Absolute Monocytes 0.4 0.0 - 1.3 10e3/uL    Absolute Eosinophils 0.1 0.0 - 0.7 10e3/uL    Absolute Basophils 0.0 0.0 - 0.2 10e3/uL    Absolute Immature Granulocytes 0.0 <=0.4 10e3/uL    Absolute NRBCs 0.0 10e3/uL   Extra Green Top (Lithium Heparin) Tube   Result Value Ref Range    Hold Specimen JIC    Extra Purple Top Tube   Result Value Ref Range    Hold Specimen JIC    ECG 12-LEAD WITH MUSE (LHE)   Result Value Ref Range    Systolic Blood Pressure 109 mmHg    Diastolic Blood Pressure 60 mmHg    Ventricular Rate 82 BPM    Atrial Rate 82 BPM    TN Interval 118 ms    QRS Duration 88 ms     ms    QTc 448 ms    P Axis 69 degrees    R AXIS 56 degrees    T Axis 43 degrees    Interpretation ECG       Sinus rhythm with sinus arrhythmia  Normal ECG  When compared with ECG of 04-Mar-2024 02:23,  Nonspecific T wave abnormality now evident in Inferior leads  Confirmed by SEE ED PROVIDER NOTE FOR, ECG INTERPRETATION ([id number]),  HAWA LEE ([id number]) on 6/22/2025 5:01:31 AM         RADIOLOGY:  Reviewed all pertinent imaging. Please see official radiology report.  KUB XR   Final Result   IMPRESSION: No radiopaque foreign bodies identified. IUD in the pelvis. Nonobstructive bowel gas pattern.          EKG:    Performed at:     Impression: EKG shows a sinus rhythm at 82 beats a minute, normal axis, normal intervals, no ST elevations        I have independently reviewed and interpreted the EKG(s) documented above.    PROCEDURES:         [employer] [facility] Documentation:   CMS Diagnoses:              I, Fiona Roth, am serving as a scribe to document services personally performed by Ventura Harris MD based on my observation and the provider's statements to me. I, Ventura Harris MD, attest that Fiona Roth is acting in a scribe capacity, has observed my performance of the services and has  documented them in accordance with my direction.    Ventura Harris MD  [facility] EMERGENCY ROOM  [facility] [facility] [place] [address] [phone]      Ventura Harris MD  06/22/25 0710

## 2025-06-22 NOTE — ED NOTES
Discussion with MD and charge nurse, not in physical police custody at this time and is not holdable.

## 2025-06-22 NOTE — ED TRIAGE NOTES
Pt presents via EMS after ingesting meth while in police custody. Pt reports she swallowed about 2 grams wrapped in foil approx 30 minutes PTA. Denies symptoms currently. Hx lupus, epilepsy. Does not take any medications. Pt no longer in police custody.     Triage Assessment (Adult)       Row Name 06/22/25 0445          Triage Assessment    Airway WDL WDL        Respiratory WDL    Respiratory WDL WDL        Skin Circulation/Temperature WDL    Skin Circulation/Temperature WDL WDL        Cardiac WDL    Cardiac WDL WDL        Peripheral/Neurovascular WDL    Peripheral Neurovascular WDL WDL        Cognitive/Neuro/Behavioral WDL    Cognitive/Neuro/Behavioral WDL WDL

## 2025-06-22 NOTE — ED PROVIDER NOTES
EMERGENCY DEPARTMENT SIGN OUT NOTE        ED COURSE AND MEDICAL DECISION MAKING  Patient was signed out to me by Dr Ventura Harris at 7:32 AM     In brief, Shantell Irizarry is a 34 year old female who initially presented with meth ingestion in tinfoil. Ingestion approximately 30 minutes prior to arrival. Also presented to [facility] on 6/5 where she ingested 2 mg of meth wrapped in tinfoil.    At time of sign out, disposition was pending observation until 1030    9:55 AM Patient has been resting comfortably all morning, requesting discharge.  I think this is reasonable as she is near her observation period and unlikely that she actually ingested meth and foil.  No sympathomimetic symptoms.  She is not suicidal or homicidal.    FINAL IMPRESSION    1. Methamphetamine use (H)        ED MEDS  Medications - No data to display    LAB  Labs Ordered and Resulted from Time of ED Arrival to Time of ED Departure   ACETAMINOPHEN LEVEL - Abnormal       Result Value    Acetaminophen <5.0 (*)    CBC WITH PLATELETS AND DIFFERENTIAL - Abnormal    WBC Count 6.4      RBC Count 4.14      Hemoglobin 11.9      Hematocrit 36.1      MCV 87      MCH 28.7      MCHC 33.0      RDW 13.2      Platelet Count 130 (*)     % Neutrophils 66      % Lymphocytes 26      % Monocytes 6      % Eosinophils 2      % Basophils 0      % Immature Granulocytes 0      NRBCs per 100 WBC 0      Absolute Neutrophils 4.2      Absolute Lymphocytes 1.6      Absolute Monocytes 0.4      Absolute Eosinophils 0.1      Absolute Basophils 0.0      Absolute Immature Granulocytes 0.0      Absolute NRBCs 0.0     BASIC METABOLIC PANEL - Normal    Sodium 138      Potassium 3.7      Chloride 105      Carbon Dioxide (CO2) 24      Anion Gap 9      Urea Nitrogen 12.1      Creatinine 0.62      GFR Estimate >90      Calcium 9.2      Glucose 98     HEPATIC FUNCTION PANEL - Normal    Protein Total 6.4      Albumin 4.0      Bilirubin Total 0.8      Alkaline Phosphatase 71      AST 24      ALT 10       Bilirubin Direct 0.21     SALICYLATE LEVEL - Normal    Salicylate <0.5     ETHANOL LEVEL BLOOD - Normal    Ethanol Level Blood <0.01     MAGNESIUM - Normal    Magnesium 1.9         RADIOLOGY    KUB XR   Final Result   IMPRESSION: No radiopaque foreign bodies identified. IUD in the pelvis. Nonobstructive bowel gas pattern.          DISCHARGE MEDS  New Prescriptions    No medications on file         Bhavana Sanders DO  Emergency Medicine  [facility] EMERGENCY ROOM  [facility] [facility] [address] [phone]     Bhavana Sanders DO  06/22/25 0956

## 2025-06-22 NOTE — ED NOTES
Bed: WWED-09  Expected date:   Expected time:   Means of arrival:   Comments:  EMS: ayla  Age/gender: 34F  CC: ingested meth, in custody